# Patient Record
Sex: MALE | Race: OTHER | NOT HISPANIC OR LATINO | Employment: UNEMPLOYED | URBAN - METROPOLITAN AREA
[De-identification: names, ages, dates, MRNs, and addresses within clinical notes are randomized per-mention and may not be internally consistent; named-entity substitution may affect disease eponyms.]

---

## 2019-07-01 ENCOUNTER — OFFICE VISIT (OUTPATIENT)
Dept: UROLOGY | Facility: CLINIC | Age: 64
End: 2019-07-01
Payer: COMMERCIAL

## 2019-07-01 VITALS
DIASTOLIC BLOOD PRESSURE: 78 MMHG | HEART RATE: 74 BPM | SYSTOLIC BLOOD PRESSURE: 122 MMHG | BODY MASS INDEX: 28.79 KG/M2 | HEIGHT: 68 IN | WEIGHT: 190 LBS

## 2019-07-01 DIAGNOSIS — Z12.5 PROSTATE CANCER SCREENING: ICD-10-CM

## 2019-07-01 DIAGNOSIS — R31.0 GROSS HEMATURIA: Primary | ICD-10-CM

## 2019-07-01 LAB
SL AMB  POCT GLUCOSE, UA: ABNORMAL
SL AMB LEUKOCYTE ESTERASE,UA: ABNORMAL
SL AMB POCT BILIRUBIN,UA: ABNORMAL
SL AMB POCT BLOOD,UA: ABNORMAL
SL AMB POCT CLARITY,UA: CLEAR
SL AMB POCT COLOR,UA: YELLOW
SL AMB POCT KETONES,UA: ABNORMAL
SL AMB POCT NITRITE,UA: ABNORMAL
SL AMB POCT PH,UA: 5
SL AMB POCT SPECIFIC GRAVITY,UA: 1.03
SL AMB POCT URINE PROTEIN: ABNORMAL
SL AMB POCT UROBILINOGEN: ABNORMAL

## 2019-07-01 PROCEDURE — 81002 URINALYSIS NONAUTO W/O SCOPE: CPT | Performed by: PHYSICIAN ASSISTANT

## 2019-07-01 PROCEDURE — 99243 OFF/OP CNSLTJ NEW/EST LOW 30: CPT | Performed by: PHYSICIAN ASSISTANT

## 2019-07-01 RX ORDER — ESCITALOPRAM OXALATE 10 MG/1
10 TABLET ORAL
COMMUNITY
Start: 2018-07-11

## 2019-07-01 RX ORDER — LISINOPRIL 5 MG/1
5 TABLET ORAL
COMMUNITY
Start: 2019-06-11

## 2019-07-01 RX ORDER — SIMVASTATIN 40 MG
40 TABLET ORAL
COMMUNITY
Start: 2019-06-11

## 2019-07-01 RX ORDER — ZOLPIDEM TARTRATE 10 MG/1
TABLET ORAL
COMMUNITY
Start: 2019-05-14

## 2019-07-01 RX ORDER — GLIPIZIDE 5 MG/1
TABLET ORAL
COMMUNITY
Start: 2017-06-02

## 2019-07-01 RX ORDER — EMPAGLIFLOZIN AND LINAGLIPTIN 25; 5 MG/1; MG/1
1 TABLET, FILM COATED ORAL
COMMUNITY
Start: 2019-06-10

## 2019-07-01 RX ORDER — ASPIRIN 81 MG/1
81 TABLET, CHEWABLE ORAL DAILY
COMMUNITY
Start: 2014-10-01

## 2019-07-01 RX ORDER — GABAPENTIN 400 MG/1
400 CAPSULE ORAL 3 TIMES DAILY
COMMUNITY
Start: 2018-12-20

## 2019-07-01 RX ORDER — TAMSULOSIN HYDROCHLORIDE 0.4 MG/1
0.4 CAPSULE ORAL
COMMUNITY
Start: 2017-05-05

## 2019-07-01 RX ORDER — CETIRIZINE HYDROCHLORIDE 10 MG/1
10 TABLET ORAL DAILY
COMMUNITY
Start: 2018-06-29 | End: 2019-10-15

## 2019-07-01 RX ORDER — ACETAMINOPHEN AND CODEINE PHOSPHATE 300; 30 MG/1; MG/1
1 TABLET ORAL EVERY 6 HOURS PRN
COMMUNITY
Start: 2015-08-20 | End: 2019-10-23 | Stop reason: HOSPADM

## 2019-07-01 RX ORDER — BETAMETHASONE DIPROPIONATE 0.5 MG/G
CREAM TOPICAL
COMMUNITY
Start: 2019-03-21

## 2019-07-01 NOTE — PROGRESS NOTES
1  Gross hematuria  POCT urine dip    Basic metabolic panel    CT renal protocol    Cystoscopy   2  Prostate cancer screening  PSA, Total Screen       Assessment and plan:       1  Gross hematuria  - I reviewed with the patient a myriad of factors in which can cause gross hematuria  We discussed hematuria evaluation including a CT urogram and cystoscopy  We also discussed a BMP and PSA  Patient will continue with tamsulosin at this time  I advised hydration and avoidance of constipation  Patient is aware to contact us in the interim he has any recurrence of hematuria  Patient verbalized understanding  All questions answered  Kasey Patrick PA-C      Chief Complaint     New patient gross hematuria    History of Present Illness     Kalen Butcher is a 66 y o   male presenting today as a new patient in regards to hematuria  Patient was in the [facility] 6/27/19 2 2 gross hematuria  Patient denied any associated back or abdominal pain  Patient's urine at that time was leukocyte and nitrite negative  Creatinine was noted to be 0 89  Patient's urine culture was negative  Patient states that there was gross hematuria in approximately 2-3 voids  He had passed a small blood clot in his urine, then his you hematuria had resolved thereafter  He has not had any recurrence of hematuria since Thursday, 6/27/2019  Patient is on a daily aspirin  Patient reports that approximately 5-6 years ago he had an episode of hematuria  He reports that he underwent a cystoscopy, however was told that there were no abnormalities  He has not followed with a urologist since  He states that this was done in [place], however unsure of the physician whom performed it  Patient does take tamsulosin 0 4 mg daily  Patient states that he has been on this medication for many years    If he misses a dose, he will notice increased urinary difficulty, including urinary hesitancy and need to strain  Patient denies any previous surgical intervention on his prostate  Patient does report of previous history of nephrolithiasis  Denies any previous  instrumentation or surgical intervention for his kidney stones  Currently denies any flank pain, nausea, vomiting, fevers, or chills  Review of Systems     Review of Systems   Constitutional: Negative for activity change, appetite change, chills, diaphoresis, fatigue, fever and unexpected weight change  Respiratory: Negative for chest tightness and shortness of breath  Cardiovascular: Negative for chest pain, palpitations and leg swelling  Gastrointestinal: Negative for abdominal distention, abdominal pain, constipation, diarrhea, nausea and vomiting  Genitourinary: Positive for hematuria  Negative for decreased urine volume, difficulty urinating, dysuria, enuresis, flank pain, frequency, genital sores and urgency  Musculoskeletal: Negative for back pain, gait problem and myalgias  Skin: Negative for color change, pallor, rash and wound  Psychiatric/Behavioral: Negative for behavioral problems  The patient is not nervous/anxious  AUA SYMPTOM SCORE      Most Recent Value   AUA SYMPTOM SCORE   How often have you had a sensation of not emptying your bladder completely after you finished urinating? 0   How often have you had to urinate again less than two hours after you finished urinating? 1   How often have you found you stopped and started again several times when you urinate?  0   How often have you found it difficult to postpone urination? 0   How often have you had a weak urinary stream?  0   How often have you had to push or strain to begin urination? 0   How many times did you most typically get up to urinate from the time you went to bed at night until the time you got up in the morning?   2   Quality of Life: If you were to spend the rest of your life with your urinary condition just the way it is now, how would you feel about that?  3   AUA SYMPTOM SCORE  3                Allergies     Allergies   Allergen Reactions    Bacitracin-Polymyxin B      Redness and swelling in eye  Redness and swelling in eye      Latex Rash       Physical Exam     Physical Exam   Constitutional: He is oriented to person, place, and time  He appears well-developed and well-nourished  No distress  HENT:   Head: Normocephalic and atraumatic  Eyes: Conjunctivae are normal    Neck: Normal range of motion  No tracheal deviation present  Pulmonary/Chest: Effort normal    Musculoskeletal: Normal range of motion  He exhibits no edema or deformity  Neurological: He is alert and oriented to person, place, and time  Skin: Skin is warm and dry  No rash noted  He is not diaphoretic  No erythema  Psychiatric: He has a normal mood and affect   His behavior is normal          Vital Signs     Vitals:    07/01/19 1015   BP: 122/78   BP Location: Left arm   Patient Position: Sitting   Cuff Size: Standard   Pulse: 74   Weight: 86 2 kg (190 lb)   Height: 5' 7 5" (1 715 m)         Current Medications       Current Outpatient Medications:     acetaminophen-codeine (TYLENOL #3) 300-30 mg per tablet, Take 1 tablet by mouth, Disp: , Rfl:     aspirin 81 mg chewable tablet, Chew, Disp: , Rfl:     betamethasone, augmented, (DIPROLENE-AF) 0 05 % cream, Apply topically, Disp: , Rfl:     Blood Glucose Monitoring Suppl (D-CARE GLUCOMETER) w/Device KIT, by Does not apply route, Disp: , Rfl:     escitalopram (LEXAPRO) 10 mg tablet, Take 10 mg by mouth daily, Disp: , Rfl:     gabapentin (NEURONTIN) 400 mg capsule, TAKE 2 CAPSULES BY MOUTH 4 TIMES DAILY, Disp: , Rfl:     glipiZIDE (GLUCOTROL) 5 mg tablet, TAKE 1 TABLET BY MOUTH TWICE DAILY BEFORE MEAL(S), Disp: , Rfl:     glucose blood test strip, USE STRIP TO CHECK GLUCOSE THREE TIMES DAILY, Disp: , Rfl:     GLYXAMBI 25-5 MG TABS, , Disp: , Rfl:     lisinopril (ZESTRIL) 5 mg tablet, , Disp: , Rfl:     metFORMIN (GLUCOPHAGE) 1000 MG tablet, TAKE 1 TABLET BY MOUTH TWICE DAILY, Disp: , Rfl:     simvastatin (ZOCOR) 40 mg tablet, , Disp: , Rfl:     tamsulosin (FLOMAX) 0 4 mg, TAKE 2 CAPSULES BY MOUTH NIGHTLY, Disp: , Rfl:     zolpidem (AMBIEN) 10 mg tablet, TAKE 1 TABLET BY MOUTH AT BEDTIME AS NEEDED FOR SLEEP, Disp: , Rfl:     cetirizine (ZYRTEC ALLERGY) 10 mg tablet, Take 10 mg by mouth daily, Disp: , Rfl:     diclofenac sodium (VOLTAREN) 50 mg EC tablet, TAKE ONE TABLET BY MOUTH THREE TIMES DAILY AS NEEDED FOR PAIN, TAKE WITH FOOD, Disp: , Rfl:     magnesium oxide (MAGOX 400) 400 mg, Take 400 mg by mouth daily, Disp: , Rfl:       Active Problems     There is no problem list on file for this patient          Past Medical History     Past Medical History:   Diagnosis Date    Diabetes mellitus ([facility] [address] )     Hyperlipemia     Hypertension     Kidney stone          Surgical History     Past Surgical History:   Procedure Laterality Date    SHOULDER SURGERY           Family History     Family History   Problem Relation Age of Onset    No Known Problems Father     Diabetes Mother          Social History     Social History       Radiology

## 2019-07-05 ENCOUNTER — TELEPHONE (OUTPATIENT)
Dept: UROLOGY | Facility: CLINIC | Age: 64
End: 2019-07-05

## 2019-07-05 NOTE — TELEPHONE ENCOUNTER
Darshan Emerson is a patient of [facility] and is seen at [facility]  Patient needs to know whether he needs one or two CTs  He states there are two written in his AVS   Please call

## 2019-07-05 NOTE — TELEPHONE ENCOUNTER
Telephoned patient left a voicemail for patient to return a call to the office to further discuss the imaging order  Office number was included  in message

## 2019-07-08 NOTE — TELEPHONE ENCOUNTER
Attempted to contact patient   Left a voicemail for patient to return a call to the office  Office number included in the message

## 2019-07-10 ENCOUNTER — HOSPITAL ENCOUNTER (OUTPATIENT)
Dept: CT IMAGING | Facility: HOSPITAL | Age: 64
Discharge: HOME | End: 2019-07-10
Payer: COMMERCIAL

## 2019-07-10 DIAGNOSIS — R31.0 GROSS HEMATURIA: ICD-10-CM

## 2019-07-10 PROCEDURE — 74178 CT ABD&PLV WO CNTR FLWD CNTR: CPT

## 2019-07-10 RX ADMIN — IOHEXOL 100 ML: 350 INJECTION, SOLUTION INTRAVENOUS at 08:31

## 2019-07-12 ENCOUNTER — TELEPHONE (OUTPATIENT)
Dept: UROLOGY | Facility: CLINIC | Age: 64
End: 2019-07-12

## 2019-07-12 NOTE — TELEPHONE ENCOUNTER
Patient's CT findings reviewed with Dr Kiera Wilcox  Patient is scheduled for upcoming cystoscopy with Dr Kiera Wilcox to complete hematuria evaluation  Imaging results will be discussed at his follow up appointment

## 2019-07-29 NOTE — PROGRESS NOTES
Problem List Items Addressed This Visit        Genitourinary    Gross hematuria - Primary     Cystoscopy is negative for malignant lesions of the bladder, a prominent prostate is noted with indentation of the bladder base with large prostatic varices as well  Upper tract imaging does show a left renal mass, measuring roughly 1 centimeter, this is a unlikely cause of hematuria given the size, although a theoretical cause of hematuria         Relevant Orders    POCT Measure PVR (Completed)    Nephrolithiasis     Patient with bilateral nonobstructing kidney stone burden, no flank pain at this time, no indication for operative intervention currently            Other    Renal mass, left     I reviewed in real time with the patient his imaging showing an enhancing renal mass at the lower pole of left kidney measuring around 1 centimeter in size  We had a long discussion today regarding his options including observation, active surveillance, renal mass biopsy, as well as partial and radical nephrectomy and the merits of each of these  After a long discussion today he wishes to proceed to surgical intervention in the form of robot assisted laparoscopic left partial nephrectomy  He has provided informed consent to proceed with this operation  He will undergo medical clearance and optimization, as well as preoperative testing, and we will proceed to the [facility] operating room for robotic laparoscopic partial nephrectomy in the coming weeks to months         Relevant Orders    Case request operating room: NEPHRECTOMY PARTIAL LAPAROSCOPIC W ROBOTICS (Completed)            Discussion:    Today we had a lengthy discussion regarding implications of a diagnosis of a renal mass  We discussed that roughly 80% of enhancing renal masses are malignant and I explained that 15-20% of newly-diagnosed localized renal lesions will prove to be benign upon resection   Such lesions (largely oncocytomas and lipid-poor angiomyolipomas) are indistinguishable from renal cell carcinoma on imaging    We therefore discussed the role of renal mass biopsy  I told him that the size of his mass makes renal mass biopsy less ideal, and that there would be a risk of bleeding and nondiagnostic biopsy  Next we discussed issues of tumor size, stage, grade and histology, and the prognostic significance of each  I explained the difference between clinical and pathologic stage, and communicated that tumor grade and histology can only be definitively assessed if the mass is resected  I also communicated the nuances regarding the anatomic complexity the patient's tumor and how this might affect surgical resection  We had a discussion regarding treatment options, which I placed in the following categories: (1) active surveillance (2) ablative techniques and (3) surgical excision (open, laparoscopic, and robotic-assisted laparoscopic surgeries)  We discussed the active surveillance of small renal masses and the follow-up protocol in terms of the need for repeat imaging and patient compliance the real burden of potential patient anxiety regarding this approach  I explained that this is the preferred modality of management and small renal masses measuring less than 2 centimeters in older and sicker patients  I did explain that surgical excision is the gold-standard treatment for localized renal cell carcinoma; however, approaches such as active surveillance and ablative techniques such as cryotherapy and radiofrequency ablation can be helpful in effectively managing renal masses for patients who are at high risk for surgical intervention  Finally, we discussed options for surgical removal of the renal mass   I explained that the goals of excisional therapy are as follows:  1) Oncological safety  2) Nephron preservation  3) Application of minimally-invasive surgical approaches when appropriate    I explained that over the past 15-20 years, partial nephrectomy (i e, removal of only the renal tumor without sacrifice of the entire kidney) has become the standard of care for appropriately sized and oriented lesions  I explained that powerful long-term data now demonstrate that patients whose renal tissue is preserved have lower risks renal insufficiency and cardiovascular disease  Most importantly, patients who undergo nephron-sparing surgery have been shown to exhibit longer overall survival than those patients whose entire kidney was removed  Risks of open, laparoscopic, and robotic-assisted laparoscopic surgery were   discussed in detail including medical and anesthetic complications (e g , heart attack, stroke, deep vein thrombosis, pulmonary embolism, infection (pneumonia, etc ), bleeding with possible need for transfusion, adjacent organ involvement or injury (including but not limited to the risk of damage to large or small bowel, liver or spleen), wound complications, and reaction to medications  We discussed that overall kidney function may be impaired  We reviewed the risks for immediate or delayed, temporary or permanent dialysis in the context of the patient's current situation  The expected hospital and post-operative courses were reviewed  We reviewed what to expect with regard to incisions, post-operative pain, and risks of subsequent hernias  I explained that during open surgery a portion of a rib may be resected  I communicated that there is always a small chance of open conversion when minimally-invasive approaches are employed and that the chance of this is around 1%  For partial nephrectomy, I communicated the additional risks of urinary leak/fistula and the potential for a prolonged drain at the operative site or a ureteral stent  I also explained that conversion to a radical nephrectomy at the time or surgery or in a delayed fashion is rare but possible    Risks of delayed bleeding in the form of arteriovenous malformation and pseudoaneurysm were also discussed  Over 35 minutes of face-to-face consultation were spent today in discussing the above, patient wishes to proceed with surgery        Assessment and plan:       Please see problem oriented charting for the assessment plan of today's urological complaints    Rigo Zapien MD      Chief Complaint     Chief Complaint   Patient presents with   Donia Mandie Hematuria    Cystoscopy         History of Present Illness     Ameena Burkett is a 66 y o  gentleman with a previous history of gross hematuria status post workup in the past which was reportedly negative  He is status post a recent CT scan of the abdomen pelvis which shows some nonobstructing stone burden, a benign cause of blood in the urine, as well as a small enhancing renal mass that the left lower pole of the medial aspect of the left kidney  He does also have a history of LUTS and takes tamsulosin for this with good treatment effect  AUA symptom score is 3 with a bother score of 3  Cystoscopic evaluation today shows a large prostate with moderate obstruction of the prostatic urethra, large prostatic varices, and no bladder tumors  Reviewed with him his imaging findings, after this information he wishes to proceed to left partial nephrectomy  The following portions of the patient's history were reviewed and updated as appropriate: allergies, current medications, past family history, past medical history, past social history, past surgical history and problem list         Detailed Urologic History     - please refer to HPI    Review of Systems     Review of Systems   Constitutional: Negative  HENT: Negative  Eyes: Negative  Respiratory: Negative  Cardiovascular: Negative  Gastrointestinal: Negative  Endocrine: Negative  Genitourinary:        As per HPI   Musculoskeletal: Negative  Skin: Negative  Allergic/Immunologic: Negative  Neurological: Negative  Hematological: Negative  Psychiatric/Behavioral: Negative  Allergies     Allergies   Allergen Reactions    Bacitracin-Polymyxin B      Redness and swelling in eye  Redness and swelling in eye      Latex Rash       Physical Exam     Physical Exam   Constitutional: He is oriented to person, place, and time  He appears well-developed and well-nourished  No distress  HENT:   Head: Normocephalic and atraumatic  Right Ear: External ear normal    Left Ear: External ear normal    Nose: Nose normal    Eyes: Conjunctivae are normal  Right eye exhibits no discharge  Left eye exhibits no discharge  No scleral icterus  Neck: No tracheal deviation present  Cardiovascular: Intact distal pulses  Pulmonary/Chest: Effort normal  No stridor  No respiratory distress  Abdominal: Soft  He exhibits no distension and no mass  There is no tenderness  There is no rebound and no guarding  No hernia  No hernias, no evidence of previous abdominal surgeries   Genitourinary:   Genitourinary Comments: Penis is normal   Musculoskeletal: Normal range of motion  He exhibits no edema, tenderness or deformity  Neurological: He is alert and oriented to person, place, and time  No cranial nerve deficit  Coordination normal    Skin: Skin is warm and dry  No rash noted  He is not diaphoretic  No erythema  No pallor  Psychiatric: He has a normal mood and affect  His behavior is normal  Judgment and thought content normal    Nursing note and vitals reviewed            Vital Signs  Vitals:    08/01/19 1001   BP: 108/70   BP Location: Right arm   Patient Position: Sitting   Cuff Size: Standard   Pulse: 80   Weight: 88 7 kg (195 lb 9 6 oz)   Height: 5' 7 5" (1 715 m)         Current Medications       Current Outpatient Medications:     acetaminophen-codeine (TYLENOL #3) 300-30 mg per tablet, Take 1 tablet by mouth, Disp: , Rfl:     aspirin 81 mg chewable tablet, Chew, Disp: , Rfl:     betamethasone, augmented, (DIPROLENE-AF) 0 05 % cream, Apply topically, Disp: , Rfl:     Blood Glucose Monitoring Suppl (D-CARE GLUCOMETER) w/Device KIT, by Does not apply route, Disp: , Rfl:     cetirizine (ZYRTEC ALLERGY) 10 mg tablet, Take 10 mg by mouth daily, Disp: , Rfl:     diclofenac sodium (VOLTAREN) 50 mg EC tablet, TAKE ONE TABLET BY MOUTH THREE TIMES DAILY AS NEEDED FOR PAIN, TAKE WITH FOOD, Disp: , Rfl:     escitalopram (LEXAPRO) 10 mg tablet, Take 10 mg by mouth daily, Disp: , Rfl:     gabapentin (NEURONTIN) 400 mg capsule, TAKE 2 CAPSULES BY MOUTH 4 TIMES DAILY, Disp: , Rfl:     glipiZIDE (GLUCOTROL) 5 mg tablet, TAKE 1 TABLET BY MOUTH TWICE DAILY BEFORE MEAL(S), Disp: , Rfl:     glucose blood test strip, USE STRIP TO CHECK GLUCOSE THREE TIMES DAILY, Disp: , Rfl:     GLYXAMBI 25-5 MG TABS, , Disp: , Rfl:     lisinopril (ZESTRIL) 5 mg tablet, , Disp: , Rfl:     magnesium oxide (MAGOX 400) 400 mg, Take 400 mg by mouth daily, Disp: , Rfl:     metFORMIN (GLUCOPHAGE) 1000 MG tablet, TAKE 1 TABLET BY MOUTH TWICE DAILY, Disp: , Rfl:     simvastatin (ZOCOR) 40 mg tablet, , Disp: , Rfl:     tamsulosin (FLOMAX) 0 4 mg, TAKE 2 CAPSULES BY MOUTH NIGHTLY, Disp: , Rfl:     zolpidem (AMBIEN) 10 mg tablet, TAKE 1 TABLET BY MOUTH AT BEDTIME AS NEEDED FOR SLEEP, Disp: , Rfl:       Active Problems     Patient Active Problem List   Diagnosis    Gross hematuria    Renal mass, left    Nephrolithiasis         Past Medical History     Past Medical History:   Diagnosis Date    Diabetes mellitus ([facility] [address] )     Hyperlipemia     Hypertension     Kidney stone          Surgical History     Past Surgical History:   Procedure Laterality Date    SHOULDER SURGERY           Family History     Family History   Problem Relation Age of Onset    No Known Problems Father     Diabetes Mother          Social History     Social History     Social History     Tobacco Use   Smoking Status Former Smoker    Packs/day: 1 00    Years: 40 00    Pack years: 40 00   Smokeless Tobacco Never Used         Pertinent Lab Values     No results found for: CREATININE    No results found for: PSA          Pertinent Imaging       The patient's images were reviewed by me personally and also in real time with them in the examination room using our PACS imaging system  The imaging findings are significant for nonobstructing calculi in the right kidney and left kidney, no ureteral tumors, a small neoplasm measuring 10 millimeters at the medial aspect of the lower pole

## 2019-07-29 NOTE — PATIENT INSTRUCTIONS
Cystoscopy   WHAT YOU NEED TO KNOW:   A cystoscopy is a procedure to look inside of your urethra and bladder using a cystoscope  A cystoscope is a small tube with a light and magnifying camera on the end  The procedure is used to diagnose and treat conditions of the bladder, urethra, and prostate  The procedure is also done to remove stones or blood clots from the urethra or bladder  Your healthcare provider may do other tests, such as ureteroscopy, during a cystoscopy  DISCHARGE INSTRUCTIONS:   Call 911 if:   · You suddenly have chest pain or trouble breathing  Seek care immediately if:   · Your urine turns from pink to red, or you have clots in your urine  · You cannot urinate and your bladder feels full  · Your pain or burning becomes worse or lasts longer than 2 days  Contact your healthcare provider or urologist if:   · Your urine stays pink for longer than 3 days  · You urinate less than normal, or still feel like you have to urinate after you use the bathroom  · Your skin is itchy, swollen, or has a new rash  · You have a fever and chills  · You have questions or concerns about your condition or care  Medicines: You may  be given any of the following:  · Antibiotics  help treat or prevent a bacterial infection  · Acetaminophen  decreases pain and fever  It is available without a doctor's order  Ask how much to take and how often to take it  Follow directions  Read the labels of all other medicines you are using to see if they also contain acetaminophen, or ask your doctor or pharmacist  Acetaminophen can cause liver damage if not taken correctly  Do not use more than 4 grams (4,000 milligrams) total of acetaminophen in one day  · Take your medicine as directed  Contact your healthcare provider if you think your medicine is not helping or if you have side effects  Tell him or her if you are allergic to any medicine  Keep a list of the medicines, vitamins, and herbs you take  Include the amounts, and when and why you take them  Bring the list or the pill bottles to follow-up visits  Carry your medicine list with you in case of an emergency  Follow up with your healthcare provider as directed: You may need to have another cystoscopy  Write down your questions so you remember to ask them during your visits  Self-care:   · Drink at least 3 to 4 glasses of water daily for 2 days after your procedure  Do not drink acidic juices such as orange juice and lemonade  Drink water to help prevent blood clots from forming  It can also help decrease the amount of acid in your urine  Acid in your urine may increase the burning feeling when you urinate  · Sit in a warm tub of water  Warm water may relieve pain and bladder spasms  · Do not have sex  until your healthcare provider tells you it is okay  Sex may increase your risk for a urinary tract infection  © 2017 [employer] Information is for End User's use only and may not be sold, redistributed or otherwise used for commercial purposes  All illustrations and images included in CareNotes® are the copyrighted property of [employer]  or [employer]ots[employer]  The above information is an  only  It is not intended as medical advice for individual conditions or treatments  Talk to your doctor, nurse or pharmacist before following any medical regimen to see if it is safe and effective for you  Laparoscopic Partial Nephrectomy   WHAT YOU NEED TO KNOW:   Laparoscopic partial nephrectomy is surgery to remove part of your kidney through small incisions  The kidneys remove waste from the blood  The waste is then flushed from your body through your urine  HOW TO PREPARE:   The week before your surgery:   · Write down the correct date, time, and location of your surgery  · Arrange a ride home  Ask a family member or friend to drive you home after your surgery or procedure   Do not drive yourself home     · Ask your caregiver if you need to stop using aspirin or any other prescribed or over-the-counter medicine before your procedure or surgery  · Bring your medicine bottles or a list of your medicines when you see your caregiver  Tell your caregiver if you are allergic to any medicine  Tell your caregiver if you use any herbs, food supplements, or over-the-counter medicine  · You may need blood or urine tests before surgery  You may also need x-rays, a CT scan, or an MRI of the kidneys  Talk to your healthcare provider about these or other tests you may need  Write down the date, time, and location for each test     · You may need to avoid solid food and drink clear liquids, such as apple juice or broth, the day before surgery  You may also need to drink a liquid that cleans out your bowel  Ask your healthcare provider for more information  The night before your surgery:   · Ask caregivers about directions for eating and drinking  · You may be given medicine to help you sleep  The day of your surgery:   · Ask your caregiver before you take any medicine on the day of your surgery  Bring a list of all the medicines you take, or your pill bottles, with you to the hospital  Caregivers will check that your medicines will not interact poorly with the medicine you need for surgery  · You or a close family member will be asked to sign a legal document called a consent form  It gives caregivers permission to do the procedure or surgery  It also explains the problems that may happen, and your choices  Make sure all your questions are answered before you sign this form  · Caregivers may insert an intravenous tube (IV) into your vein  A vein in the arm is usually chosen  Through the IV tube, you may be given liquids and medicine  · An anesthesiologist will talk to you before your surgery  You may need medicine to keep you asleep or numb an area of your body during surgery   Tell caregivers if you or anyone in your family has had a problem with anesthesia in the past   WHAT WILL HAPPEN:   What will happen: Your healthcare provider will make 3 to 5 small incisions near your kidney  Your abdomen will be filled with carbon dioxide to gently lift the muscles away from your organs  This helps the surgeon see your kidney better  Your surgeon will remove the damaged part of your kidney  Your adrenal gland may also be removed  The adrenal gland sits on top of your kidney and produces hormones  Your incisions will be closed with stitches or medical glue and covered with bandages  After your surgery: You will be taken to a room to rest until you are fully awake  Healthcare providers will monitor you closely for any problems  Do not get out of bed until your healthcare provider says it is okay  When your healthcare provider sees that you are okay, you will be able to go home or taken to your hospital room  CONTACT YOUR HEALTHCARE PROVIDER IF:   · You cannot make it to your surgery  · You have a fever  · You get a cold or the flu  · You have questions or concerns about your surgery  SEEK CARE IMMEDIATELY IF:   · Your symptoms get worse  RISKS:   · You may get an infection or bleed more than expected  You may need 1 large incision instead of smaller ones  Nerves, blood vessels, muscles, or organs may be damaged  The carbon dioxide used during surgery may cause shoulder or chest pain for 1 to 2 days after your surgery  The remaining part of your kidney may not work as well as it did before  · Without treatment, your signs and symptoms will get worse  Your kidney may not be able to completely remove waste from your body  Too much waste in your body can cause dizziness, seizures, or confusion  Toxic waste buildup in your body can be life-threatening  CARE AGREEMENT:   You have the right to help plan your care  Learn about your health condition and how it may be treated   Discuss treatment options with your caregivers to decide what care you want to receive  You always have the right to refuse treatment  © 2017 [employer] Information is for End User's use only and may not be sold, redistributed or otherwise used for commercial purposes  All illustrations and images included in CareNotes® are the copyrighted property of [employer]  or Is[employer]rael [employer]  The above information is an  only  It is not intended as medical advice for individual conditions or treatments  Talk to your doctor, nurse or pharmacist before following any medical regimen to see if it is safe and effective for you

## 2019-08-01 ENCOUNTER — PROCEDURE VISIT (OUTPATIENT)
Dept: UROLOGY | Facility: CLINIC | Age: 64
End: 2019-08-01
Payer: COMMERCIAL

## 2019-08-01 VITALS
HEART RATE: 80 BPM | BODY MASS INDEX: 29.64 KG/M2 | WEIGHT: 195.6 LBS | SYSTOLIC BLOOD PRESSURE: 108 MMHG | DIASTOLIC BLOOD PRESSURE: 70 MMHG | HEIGHT: 68 IN

## 2019-08-01 DIAGNOSIS — N28.89 RENAL MASS, LEFT: ICD-10-CM

## 2019-08-01 DIAGNOSIS — N20.0 NEPHROLITHIASIS: ICD-10-CM

## 2019-08-01 DIAGNOSIS — R31.0 GROSS HEMATURIA: Primary | ICD-10-CM

## 2019-08-01 LAB — POST-VOID RESIDUAL VOLUME, ML POC: 59 ML

## 2019-08-01 PROCEDURE — 51798 US URINE CAPACITY MEASURE: CPT | Performed by: UROLOGY

## 2019-08-01 PROCEDURE — 99215 OFFICE O/P EST HI 40 MIN: CPT | Performed by: UROLOGY

## 2019-08-01 PROCEDURE — 52000 CYSTOURETHROSCOPY: CPT | Performed by: UROLOGY

## 2019-08-01 NOTE — PROGRESS NOTES
Office Cystoscopy Procedure Note    Indication:    Hematuria    Informed consent   The risks, benefits, complications, treatment options, and expected outcomes were discussed with the patient  The patient concurred with the proposed plan and provided informed consent  Anesthesia  Lidocaine jelly 2%    Antibiotic prophylaxis   None    Procedure  The patient was placed in the supineposition, was prepped and draped in the usual manner using sterile technique, and 2% lidocaine jelly instilled into the urethra  A 17 F flexible cystoscope was then inserted into the urethra and the urethra and bladder carefully examined  The following findings were noted:    Findings:  Urethra:  Normal urethra, no stricture, intact sphincter mechanism  Prostate:   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, no median lobe, the prostate projects into the lumen of the bladder by 1 centimeter  Bladder:  No lesions, tumors, defects stones or carcinoma in situ  Ureteral orifices:  Orthotopic  Other findings:  None, retroflexed view confirms    Specimens: None                 Complications:    None; patient tolerated the procedure well           Disposition: To home           Condition: Stable    Plan: Negative cystoscopic evaluation apart from moderate to high-grade obstruction the prostatic urethra with prostatic varices, no bladder tumors    Cystoscopy  Date/Time: 8/1/2019 10:29 AM  Performed by: Becky Stewart MD  Authorized by: Becky Stewart MD     Procedure details: cystoscopy    Patient tolerance: Patient tolerated the procedure well with no immediate complications    Additional Procedure Details: Negative cystoscopic evaluation apart from moderate to high-grade obstruction the prostatic urethra with prostatic varices, no bladder tumors

## 2019-08-01 NOTE — LETTER
August 1, 2019     Luma Ramirez MD  [address]  [address] [facility] [address]    Patient: Wanda Orlando   YOB: 1955   Date of Visit: 8/1/2019       Dear Dr Violetta Rinne:    Thank you for referring Delfino Banerjee to me for evaluation  Below are my notes for this consultation  If you have questions, please do not hesitate to call me  I look forward to following your patient along with you  Sincerely,        Laya Baum MD        CC: RADHA Quach MD  8/1/2019 10:29 AM  Sign at close encounter  Office Cystoscopy Procedure Note    Indication:    Hematuria    Informed consent   The risks, benefits, complications, treatment options, and expected outcomes were discussed with the patient  The patient concurred with the proposed plan and provided informed consent  Anesthesia  Lidocaine jelly 2%    Antibiotic prophylaxis   None    Procedure  The patient was placed in the supineposition, was prepped and draped in the usual manner using sterile technique, and 2% lidocaine jelly instilled into the urethra  A 17 F flexible cystoscope was then inserted into the urethra and the urethra and bladder carefully examined  The following findings were noted:    Findings:  Urethra:  Normal urethra, no stricture, intact sphincter mechanism  Prostate:   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, no median lobe, the prostate projects into the lumen of the bladder by 1 centimeter  Bladder:  No lesions, tumors, defects stones or carcinoma in situ  Ureteral orifices:  Orthotopic  Other findings:  None, retroflexed view confirms    Specimens: None                 Complications:    None; patient tolerated the procedure well           Disposition: To home           Condition: Stable    Plan: Negative cystoscopic evaluation apart from moderate to high-grade obstruction the prostatic urethra with prostatic varices, no bladder tumors    Cystoscopy  Date/Time: 8/1/2019 10:29 AM  Performed by: Denise Panchal MD  Authorized by: Denise Panchal MD     Procedure details: cystoscopy    Patient tolerance: Patient tolerated the procedure well with no immediate complications    Additional Procedure Details: Negative cystoscopic evaluation apart from moderate to high-grade obstruction the prostatic urethra with prostatic varices, no bladder tumors                  Denise Panchal MD  8/1/2019 10:28 AM  Sign at close encounter       Problem List Items Addressed This Visit        Genitourinary    Gross hematuria - Primary     Cystoscopy is negative for malignant lesions of the bladder, a prominent prostate is noted with indentation of the bladder base with large prostatic varices as well  Upper tract imaging does show a left renal mass, measuring roughly 1 centimeter, this is a unlikely cause of hematuria given the size, although a theoretical cause of hematuria         Relevant Orders    POCT Measure PVR (Completed)    Nephrolithiasis     Patient with bilateral nonobstructing kidney stone burden, no flank pain at this time, no indication for operative intervention currently            Other    Renal mass, left     I reviewed in real time with the patient his imaging showing an enhancing renal mass at the lower pole of left kidney measuring around 1 centimeter in size  We had a long discussion today regarding his options including observation, active surveillance, renal mass biopsy, as well as partial and radical nephrectomy and the merits of each of these  After a long discussion today he wishes to proceed to surgical intervention in the form of robot assisted laparoscopic left partial nephrectomy  He has provided informed consent to proceed with this operation      He will undergo medical clearance and optimization, as well as preoperative testing, and we will proceed to the [facility] operating room for robotic laparoscopic partial nephrectomy in the coming weeks to months         Relevant Orders    Case request operating room: NEPHRECTOMY PARTIAL LAPAROSCOPIC W ROBOTICS (Completed)            Discussion:    Today we had a lengthy discussion regarding implications of a diagnosis of a renal mass  We discussed that roughly 80% of enhancing renal masses are malignant and I explained that 15-20% of newly-diagnosed localized renal lesions will prove to be benign upon resection  Such lesions (largely oncocytomas and lipid-poor angiomyolipomas) are indistinguishable from renal cell carcinoma on imaging    We therefore discussed the role of renal mass biopsy  I told him that the size of his mass makes renal mass biopsy less ideal, and that there would be a risk of bleeding and nondiagnostic biopsy  Next we discussed issues of tumor size, stage, grade and histology, and the prognostic significance of each  I explained the difference between clinical and pathologic stage, and communicated that tumor grade and histology can only be definitively assessed if the mass is resected  I also communicated the nuances regarding the anatomic complexity the patient's tumor and how this might affect surgical resection  We had a discussion regarding treatment options, which I placed in the following categories: (1) active surveillance (2) ablative techniques and (3) surgical excision (open, laparoscopic, and robotic-assisted laparoscopic surgeries)  We discussed the active surveillance of small renal masses and the follow-up protocol in terms of the need for repeat imaging and patient compliance the real burden of potential patient anxiety regarding this approach  I explained that this is the preferred modality of management and small renal masses measuring less than 2 centimeters in older and sicker patients      I did explain that surgical excision is the gold-standard treatment for localized renal cell carcinoma; however, approaches such as active surveillance and ablative techniques such as cryotherapy and radiofrequency ablation can be helpful in effectively managing renal masses for patients who are at high risk for surgical intervention  Finally, we discussed options for surgical removal of the renal mass  I explained that the goals of excisional therapy are as follows:  1) Oncological safety  2) Nephron preservation  3) Application of minimally-invasive surgical approaches when appropriate    I explained that over the past 15-20 years, partial nephrectomy (i e, removal of only the renal tumor without sacrifice of the entire kidney) has become the standard of care for appropriately sized and oriented lesions  I explained that powerful long-term data now demonstrate that patients whose renal tissue is preserved have lower risks renal insufficiency and cardiovascular disease  Most importantly, patients who undergo nephron-sparing surgery have been shown to exhibit longer overall survival than those patients whose entire kidney was removed  Risks of open, laparoscopic, and robotic-assisted laparoscopic surgery were   discussed in detail including medical and anesthetic complications (e g , heart attack, stroke, deep vein thrombosis, pulmonary embolism, infection (pneumonia, etc ), bleeding with possible need for transfusion, adjacent organ involvement or injury (including but not limited to the risk of damage to large or small bowel, liver or spleen), wound complications, and reaction to medications  We discussed that overall kidney function may be impaired  We reviewed the risks for immediate or delayed, temporary or permanent dialysis in the context of the patient's current situation  The expected hospital and post-operative courses were reviewed  We reviewed what to expect with regard to incisions, post-operative pain, and risks of subsequent hernias  I explained that during open surgery a portion of a rib may be resected   I communicated that there is always a small chance of open conversion when minimally-invasive approaches are employed and that the chance of this is around 1%  For partial nephrectomy, I communicated the additional risks of urinary leak/fistula and the potential for a prolonged drain at the operative site or a ureteral stent  I also explained that conversion to a radical nephrectomy at the time or surgery or in a delayed fashion is rare but possible  Risks of delayed bleeding in the form of arteriovenous malformation and pseudoaneurysm were also discussed  Over 35 minutes of face-to-face consultation were spent today in discussing the above, patient wishes to proceed with surgery        Assessment and plan:       Please see problem oriented charting for the assessment plan of today's urological complaints    Ward Mcneal MD      Chief Complaint     Chief Complaint   Patient presents with   Thurmon Dottie Hematuria    Cystoscopy         History of Present Illness     Porsche Reed is a 66 y o  gentleman with a previous history of gross hematuria status post workup in the past which was reportedly negative  He is status post a recent CT scan of the abdomen pelvis which shows some nonobstructing stone burden, a benign cause of blood in the urine, as well as a small enhancing renal mass that the left lower pole of the medial aspect of the left kidney  He does also have a history of LUTS and takes tamsulosin for this with good treatment effect  AUA symptom score is 3 with a bother score of 3  Cystoscopic evaluation today shows a large prostate with moderate obstruction of the prostatic urethra, large prostatic varices, and no bladder tumors  Reviewed with him his imaging findings, after this information he wishes to proceed to left partial nephrectomy      The following portions of the patient's history were reviewed and updated as appropriate: allergies, current medications, past family history, past medical history, past social history, past surgical history and problem list         Detailed Urologic History     - please refer to HPI    Review of Systems     Review of Systems   Constitutional: Negative  HENT: Negative  Eyes: Negative  Respiratory: Negative  Cardiovascular: Negative  Gastrointestinal: Negative  Endocrine: Negative  Genitourinary:        As per HPI   Musculoskeletal: Negative  Skin: Negative  Allergic/Immunologic: Negative  Neurological: Negative  Hematological: Negative  Psychiatric/Behavioral: Negative  Allergies     Allergies   Allergen Reactions    Bacitracin-Polymyxin B      Redness and swelling in eye  Redness and swelling in eye      Latex Rash       Physical Exam     Physical Exam   Constitutional: He is oriented to person, place, and time  He appears well-developed and well-nourished  No distress  HENT:   Head: Normocephalic and atraumatic  Right Ear: External ear normal    Left Ear: External ear normal    Nose: Nose normal    Eyes: Conjunctivae are normal  Right eye exhibits no discharge  Left eye exhibits no discharge  No scleral icterus  Neck: No tracheal deviation present  Cardiovascular: Intact distal pulses  Pulmonary/Chest: Effort normal  No stridor  No respiratory distress  Abdominal: Soft  He exhibits no distension and no mass  There is no tenderness  There is no rebound and no guarding  No hernia  No hernias, no evidence of previous abdominal surgeries   Genitourinary:   Genitourinary Comments: Penis is normal   Musculoskeletal: Normal range of motion  He exhibits no edema, tenderness or deformity  Neurological: He is alert and oriented to person, place, and time  No cranial nerve deficit  Coordination normal    Skin: Skin is warm and dry  No rash noted  He is not diaphoretic  No erythema  No pallor  Psychiatric: He has a normal mood and affect   His behavior is normal  Judgment and thought content normal  Nursing note and vitals reviewed            Vital Signs  Vitals:    08/01/19 1001   BP: 108/70   BP Location: Right arm   Patient Position: Sitting   Cuff Size: Standard   Pulse: 80   Weight: 88 7 kg (195 lb 9 6 oz)   Height: 5' 7 5" (1 715 m)         Current Medications       Current Outpatient Medications:     acetaminophen-codeine (TYLENOL #3) 300-30 mg per tablet, Take 1 tablet by mouth, Disp: , Rfl:     aspirin 81 mg chewable tablet, Chew, Disp: , Rfl:     betamethasone, augmented, (DIPROLENE-AF) 0 05 % cream, Apply topically, Disp: , Rfl:     Blood Glucose Monitoring Suppl (D-CARE GLUCOMETER) w/Device KIT, by Does not apply route, Disp: , Rfl:     cetirizine (ZYRTEC ALLERGY) 10 mg tablet, Take 10 mg by mouth daily, Disp: , Rfl:     diclofenac sodium (VOLTAREN) 50 mg EC tablet, TAKE ONE TABLET BY MOUTH THREE TIMES DAILY AS NEEDED FOR PAIN, TAKE WITH FOOD, Disp: , Rfl:     escitalopram (LEXAPRO) 10 mg tablet, Take 10 mg by mouth daily, Disp: , Rfl:     gabapentin (NEURONTIN) 400 mg capsule, TAKE 2 CAPSULES BY MOUTH 4 TIMES DAILY, Disp: , Rfl:     glipiZIDE (GLUCOTROL) 5 mg tablet, TAKE 1 TABLET BY MOUTH TWICE DAILY BEFORE MEAL(S), Disp: , Rfl:     glucose blood test strip, USE STRIP TO CHECK GLUCOSE THREE TIMES DAILY, Disp: , Rfl:     GLYXAMBI 25-5 MG TABS, , Disp: , Rfl:     lisinopril (ZESTRIL) 5 mg tablet, , Disp: , Rfl:     magnesium oxide (MAGOX 400) 400 mg, Take 400 mg by mouth daily, Disp: , Rfl:     metFORMIN (GLUCOPHAGE) 1000 MG tablet, TAKE 1 TABLET BY MOUTH TWICE DAILY, Disp: , Rfl:     simvastatin (ZOCOR) 40 mg tablet, , Disp: , Rfl:     tamsulosin (FLOMAX) 0 4 mg, TAKE 2 CAPSULES BY MOUTH NIGHTLY, Disp: , Rfl:     zolpidem (AMBIEN) 10 mg tablet, TAKE 1 TABLET BY MOUTH AT BEDTIME AS NEEDED FOR SLEEP, Disp: , Rfl:       Active Problems     Patient Active Problem List   Diagnosis    Gross hematuria    Renal mass, left    Nephrolithiasis         Past Medical History     Past Medical History:   Diagnosis Date    Diabetes mellitus ([facility] [address] )     Hyperlipemia     Hypertension     Kidney stone          Surgical History     Past Surgical History:   Procedure Laterality Date    SHOULDER SURGERY           Family History     Family History   Problem Relation Age of Onset    No Known Problems Father     Diabetes Mother          Social History     Social History     Social History     Tobacco Use   Smoking Status Former Smoker    Packs/day: 1 00    Years: 40 00    Pack years: 40 00   Smokeless Tobacco Never Used         Pertinent Lab Values     No results found for: CREATININE    No results found for: PSA          Pertinent Imaging       The patient's images were reviewed by me personally and also in real time with them in the examination room using our PACS imaging system  The imaging findings are significant for nonobstructing calculi in the right kidney and left kidney, no ureteral tumors, a small neoplasm measuring 10 millimeters at the medial aspect of the lower pole

## 2019-08-01 NOTE — ASSESSMENT & PLAN NOTE
Cystoscopy is negative for malignant lesions of the bladder, a prominent prostate is noted with indentation of the bladder base with large prostatic varices as well      Upper tract imaging does show a left renal mass, measuring roughly 1 centimeter, this is a unlikely cause of hematuria given the size, although a theoretical cause of hematuria

## 2019-08-01 NOTE — ASSESSMENT & PLAN NOTE
Patient with bilateral nonobstructing kidney stone burden, no flank pain at this time, no indication for operative intervention currently

## 2019-08-01 NOTE — ASSESSMENT & PLAN NOTE
I reviewed in real time with the patient his imaging showing an enhancing renal mass at the lower pole of left kidney measuring around 1 centimeter in size  We had a long discussion today regarding his options including observation, active surveillance, renal mass biopsy, as well as partial and radical nephrectomy and the merits of each of these  After a long discussion today he wishes to proceed to surgical intervention in the form of robot assisted laparoscopic left partial nephrectomy  He has provided informed consent to proceed with this operation      He will undergo medical clearance and optimization, as well as preoperative testing, and we will proceed to the [facility] operating room for robotic laparoscopic partial nephrectomy in the coming weeks to months

## 2019-08-02 ENCOUNTER — TELEPHONE (OUTPATIENT)
Dept: UROLOGY | Facility: CLINIC | Age: 64
End: 2019-08-02

## 2019-10-15 ENCOUNTER — TELEPHONE (OUTPATIENT)
Dept: UROLOGY | Facility: CLINIC | Age: 64
End: 2019-10-15

## 2019-10-15 DIAGNOSIS — Z91.89 RISK FACTORS FOR OBSTRUCTIVE SLEEP APNEA: Primary | ICD-10-CM

## 2019-10-15 RX ORDER — OMEGA-3-ACID ETHYL ESTERS 1 G/1
2 CAPSULE, LIQUID FILLED ORAL 2 TIMES DAILY
COMMUNITY

## 2019-10-15 RX ORDER — RANITIDINE 150 MG/1
150 CAPSULE ORAL
COMMUNITY

## 2019-10-15 RX ORDER — PHENOL 1.4 %
10 AEROSOL, SPRAY (ML) MUCOUS MEMBRANE
COMMUNITY

## 2019-10-15 RX ORDER — DIPHENOXYLATE HYDROCHLORIDE AND ATROPINE SULFATE 2.5; .025 MG/1; MG/1
1 TABLET ORAL DAILY
COMMUNITY

## 2019-10-15 NOTE — TELEPHONE ENCOUNTER
Spoke to pt's PCP's office, asked for clearance note to be updated to state if the pt is cleared and that the pt's HA1C is 7 7  I was told a note will be sent to Dr Aileen Douglas asking for everything to be updated

## 2019-10-16 ENCOUNTER — OFFICE VISIT (OUTPATIENT)
Dept: LAB | Facility: HOSPITAL | Age: 64
End: 2019-10-16
Attending: UROLOGY
Payer: COMMERCIAL

## 2019-10-16 ENCOUNTER — TRANSCRIBE ORDERS (OUTPATIENT)
Dept: ADMINISTRATIVE | Facility: HOSPITAL | Age: 64
End: 2019-10-16

## 2019-10-16 ENCOUNTER — APPOINTMENT (OUTPATIENT)
Dept: LAB | Facility: HOSPITAL | Age: 64
End: 2019-10-16
Attending: UROLOGY
Payer: COMMERCIAL

## 2019-10-16 DIAGNOSIS — N28.89 RENAL MASS: ICD-10-CM

## 2019-10-16 DIAGNOSIS — N28.89 RENAL MASS: Primary | ICD-10-CM

## 2019-10-16 LAB
ABO GROUP BLD: NORMAL
ANION GAP SERPL CALCULATED.3IONS-SCNC: 11 MMOL/L (ref 4–13)
APTT PPP: 31 SECONDS (ref 23–37)
BASOPHILS # BLD AUTO: 0.03 THOUSANDS/ΜL (ref 0–0.1)
BASOPHILS NFR BLD AUTO: 0 % (ref 0–1)
BLD GP AB SCN SERPL QL: NEGATIVE
BUN SERPL-MCNC: 17 MG/DL (ref 5–25)
CALCIUM SERPL-MCNC: 9.2 MG/DL (ref 8.3–10.1)
CHLORIDE SERPL-SCNC: 98 MMOL/L (ref 100–108)
CO2 SERPL-SCNC: 28 MMOL/L (ref 21–32)
CREAT SERPL-MCNC: 1.01 MG/DL (ref 0.6–1.3)
EOSINOPHIL # BLD AUTO: 0.08 THOUSAND/ΜL (ref 0–0.61)
EOSINOPHIL NFR BLD AUTO: 1 % (ref 0–6)
ERYTHROCYTE [DISTWIDTH] IN BLOOD BY AUTOMATED COUNT: 13.1 % (ref 11.6–15.1)
EST. AVERAGE GLUCOSE BLD GHB EST-MCNC: 163 MG/DL
GFR SERPL CREATININE-BSD FRML MDRD: 79 ML/MIN/1.73SQ M
GLUCOSE SERPL-MCNC: 163 MG/DL (ref 65–140)
HBA1C MFR BLD: 7.3 % (ref 4.2–6.3)
HCT VFR BLD AUTO: 44.3 % (ref 36.5–49.3)
HGB BLD-MCNC: 14.5 G/DL (ref 12–17)
IMM GRANULOCYTES # BLD AUTO: 0.04 THOUSAND/UL (ref 0–0.2)
IMM GRANULOCYTES NFR BLD AUTO: 1 % (ref 0–2)
INR PPP: 0.99 (ref 0.84–1.19)
LYMPHOCYTES # BLD AUTO: 2.04 THOUSANDS/ΜL (ref 0.6–4.47)
LYMPHOCYTES NFR BLD AUTO: 29 % (ref 14–44)
MCH RBC QN AUTO: 28.7 PG (ref 26.8–34.3)
MCHC RBC AUTO-ENTMCNC: 32.7 G/DL (ref 31.4–37.4)
MCV RBC AUTO: 88 FL (ref 82–98)
MONOCYTES # BLD AUTO: 0.7 THOUSAND/ΜL (ref 0.17–1.22)
MONOCYTES NFR BLD AUTO: 10 % (ref 4–12)
NEUTROPHILS # BLD AUTO: 4.23 THOUSANDS/ΜL (ref 1.85–7.62)
NEUTS SEG NFR BLD AUTO: 59 % (ref 43–75)
NRBC BLD AUTO-RTO: 0 /100 WBCS
PLATELET # BLD AUTO: 172 THOUSANDS/UL (ref 149–390)
PMV BLD AUTO: 10.7 FL (ref 8.9–12.7)
POTASSIUM SERPL-SCNC: 4.5 MMOL/L (ref 3.5–5.3)
PROTHROMBIN TIME: 13.1 SECONDS (ref 11.6–14.5)
RBC # BLD AUTO: 5.05 MILLION/UL (ref 3.88–5.62)
RH BLD: POSITIVE
SODIUM SERPL-SCNC: 137 MMOL/L (ref 136–145)
SPECIMEN EXPIRATION DATE: NORMAL
WBC # BLD AUTO: 7.12 THOUSAND/UL (ref 4.31–10.16)

## 2019-10-16 PROCEDURE — 86901 BLOOD TYPING SEROLOGIC RH(D): CPT

## 2019-10-16 PROCEDURE — 85025 COMPLETE CBC W/AUTO DIFF WBC: CPT

## 2019-10-16 PROCEDURE — 86920 COMPATIBILITY TEST SPIN: CPT

## 2019-10-16 PROCEDURE — 80048 BASIC METABOLIC PNL TOTAL CA: CPT

## 2019-10-16 PROCEDURE — 85730 THROMBOPLASTIN TIME PARTIAL: CPT

## 2019-10-16 PROCEDURE — 36415 COLL VENOUS BLD VENIPUNCTURE: CPT

## 2019-10-16 PROCEDURE — 83036 HEMOGLOBIN GLYCOSYLATED A1C: CPT

## 2019-10-16 PROCEDURE — 86850 RBC ANTIBODY SCREEN: CPT

## 2019-10-16 PROCEDURE — 93005 ELECTROCARDIOGRAM TRACING: CPT

## 2019-10-16 PROCEDURE — 85610 PROTHROMBIN TIME: CPT

## 2019-10-16 PROCEDURE — 86900 BLOOD TYPING SEROLOGIC ABO: CPT

## 2019-10-16 NOTE — TELEPHONE ENCOUNTER
Spoke to [place] from Dr Alexi Ruiz office  The note has been updated to address the A1C and to state if the pt is cleared for surgery  [place] will fax to the note to me

## 2019-10-17 ENCOUNTER — TELEPHONE (OUTPATIENT)
Dept: UROLOGY | Facility: AMBULATORY SURGERY CENTER | Age: 64
End: 2019-10-17

## 2019-10-17 LAB
ATRIAL RATE: 60 BPM
P AXIS: 47 DEGREES
PR INTERVAL: 172 MS
QRS AXIS: -9 DEGREES
QRSD INTERVAL: 88 MS
QT INTERVAL: 384 MS
QTC INTERVAL: 384 MS
T WAVE AXIS: 24 DEGREES
VENTRICULAR RATE: 60 BPM

## 2019-10-17 PROCEDURE — 93010 ELECTROCARDIOGRAM REPORT: CPT | Performed by: INTERNAL MEDICINE

## 2019-10-17 NOTE — TELEPHONE ENCOUNTER
Adilia Calabrese called back and put his daughter Giuliano Jones on the phone  Reviewed bowel prep  Reviewed P/O restrictions including no lifting greater than 10 lbs for 6 weeks after surgery and no driving until cleared at his P/O visit with Dr Jaleesa Leos  They are aware of the date, time, and location of the P/O visit  Giulianoinocente Jones will help Adiliaindira Calabrese during his recovery  He does not have any transportation issues  They were encouraged to call with questions

## 2019-10-17 NOTE — TELEPHONE ENCOUNTER
Mark Barbosa is scheduled for robotic laparoscopic partial nephrectomy on 10/21/19 with Dr Chris Wilcox  L/JOSH [phone]  Left my direct extension and requested a call back

## 2019-10-20 ENCOUNTER — ANESTHESIA EVENT (OUTPATIENT)
Dept: PERIOP | Facility: HOSPITAL | Age: 64
End: 2019-10-20
Payer: COMMERCIAL

## 2019-10-21 ENCOUNTER — HOSPITAL ENCOUNTER (OUTPATIENT)
Facility: HOSPITAL | Age: 64
Setting detail: OUTPATIENT SURGERY
Discharge: HOME | End: 2019-10-23
Attending: UROLOGY | Admitting: UROLOGY
Payer: COMMERCIAL

## 2019-10-21 ENCOUNTER — TELEPHONE (OUTPATIENT)
Dept: UROLOGY | Facility: CLINIC | Age: 64
End: 2019-10-21

## 2019-10-21 ENCOUNTER — ANESTHESIA (OUTPATIENT)
Dept: PERIOP | Facility: HOSPITAL | Age: 64
End: 2019-10-21
Payer: COMMERCIAL

## 2019-10-21 DIAGNOSIS — N28.89 RENAL MASS, LEFT: ICD-10-CM

## 2019-10-21 LAB
ANION GAP SERPL CALCULATED.3IONS-SCNC: 10 MMOL/L (ref 4–13)
BUN SERPL-MCNC: 16 MG/DL (ref 5–25)
CALCIUM SERPL-MCNC: 8 MG/DL (ref 8.3–10.1)
CHLORIDE SERPL-SCNC: 104 MMOL/L (ref 100–108)
CO2 SERPL-SCNC: 25 MMOL/L (ref 21–32)
CREAT SERPL-MCNC: 0.91 MG/DL (ref 0.6–1.3)
ERYTHROCYTE [DISTWIDTH] IN BLOOD BY AUTOMATED COUNT: 13 % (ref 11.6–15.1)
GFR SERPL CREATININE-BSD FRML MDRD: 90 ML/MIN/1.73SQ M
GLUCOSE P FAST SERPL-MCNC: 178 MG/DL (ref 65–99)
GLUCOSE SERPL-MCNC: 136 MG/DL (ref 65–140)
GLUCOSE SERPL-MCNC: 148 MG/DL (ref 65–140)
GLUCOSE SERPL-MCNC: 151 MG/DL (ref 65–140)
GLUCOSE SERPL-MCNC: 175 MG/DL (ref 65–140)
GLUCOSE SERPL-MCNC: 178 MG/DL (ref 65–140)
GLUCOSE SERPL-MCNC: 208 MG/DL (ref 65–140)
HCT VFR BLD AUTO: 40.2 % (ref 36.5–49.3)
HGB BLD-MCNC: 13.3 G/DL (ref 12–17)
MCH RBC QN AUTO: 29.4 PG (ref 26.8–34.3)
MCHC RBC AUTO-ENTMCNC: 33.1 G/DL (ref 31.4–37.4)
MCV RBC AUTO: 89 FL (ref 82–98)
PLATELET # BLD AUTO: 149 THOUSANDS/UL (ref 149–390)
PMV BLD AUTO: 9.7 FL (ref 8.9–12.7)
POTASSIUM SERPL-SCNC: 3.8 MMOL/L (ref 3.5–5.3)
RBC # BLD AUTO: 4.52 MILLION/UL (ref 3.88–5.62)
SODIUM SERPL-SCNC: 139 MMOL/L (ref 136–145)
WBC # BLD AUTO: 6.49 THOUSAND/UL (ref 4.31–10.16)

## 2019-10-21 PROCEDURE — 88342 IMHCHEM/IMCYTCHM 1ST ANTB: CPT | Performed by: PATHOLOGY

## 2019-10-21 PROCEDURE — 88307 TISSUE EXAM BY PATHOLOGIST: CPT | Performed by: PATHOLOGY

## 2019-10-21 PROCEDURE — 50543 LAPARO PARTIAL NEPHRECTOMY: CPT | Performed by: UROLOGY

## 2019-10-21 PROCEDURE — 88344 IMHCHEM/IMCYTCHM EA MLT ANTB: CPT | Performed by: PATHOLOGY

## 2019-10-21 PROCEDURE — NC001 PR NO CHARGE: Performed by: UROLOGY

## 2019-10-21 PROCEDURE — 88341 IMHCHEM/IMCYTCHM EA ADD ANTB: CPT | Performed by: PATHOLOGY

## 2019-10-21 PROCEDURE — 82948 REAGENT STRIP/BLOOD GLUCOSE: CPT

## 2019-10-21 PROCEDURE — NC001 PR NO CHARGE: Performed by: PHYSICIAN ASSISTANT

## 2019-10-21 PROCEDURE — 85027 COMPLETE CBC AUTOMATED: CPT | Performed by: UROLOGY

## 2019-10-21 PROCEDURE — 80048 BASIC METABOLIC PNL TOTAL CA: CPT | Performed by: UROLOGY

## 2019-10-21 RX ORDER — DOCUSATE SODIUM 100 MG/1
100 CAPSULE, LIQUID FILLED ORAL 3 TIMES DAILY
Qty: 42 CAPSULE | Refills: 0 | Status: SHIPPED | OUTPATIENT
Start: 2019-10-21 | End: 2019-11-05

## 2019-10-21 RX ORDER — OXYCODONE HYDROCHLORIDE 10 MG/1
10 TABLET ORAL EVERY 4 HOURS PRN
Status: DISCONTINUED | OUTPATIENT
Start: 2019-10-21 | End: 2019-10-23 | Stop reason: HOSPADM

## 2019-10-21 RX ORDER — PRAVASTATIN SODIUM 80 MG/1
80 TABLET ORAL
Status: DISCONTINUED | OUTPATIENT
Start: 2019-10-21 | End: 2019-10-23 | Stop reason: HOSPADM

## 2019-10-21 RX ORDER — HYDROMORPHONE HCL/PF 1 MG/ML
0.5 SYRINGE (ML) INJECTION
Status: DISCONTINUED | OUTPATIENT
Start: 2019-10-21 | End: 2019-10-23 | Stop reason: HOSPADM

## 2019-10-21 RX ORDER — ONDANSETRON 2 MG/ML
4 INJECTION INTRAMUSCULAR; INTRAVENOUS ONCE AS NEEDED
Status: DISCONTINUED | OUTPATIENT
Start: 2019-10-21 | End: 2019-10-21 | Stop reason: HOSPADM

## 2019-10-21 RX ORDER — OXYCODONE HYDROCHLORIDE 5 MG/1
5 TABLET ORAL EVERY 4 HOURS PRN
Status: DISCONTINUED | OUTPATIENT
Start: 2019-10-21 | End: 2019-10-23 | Stop reason: HOSPADM

## 2019-10-21 RX ORDER — SODIUM CHLORIDE, SODIUM LACTATE, POTASSIUM CHLORIDE, CALCIUM CHLORIDE 600; 310; 30; 20 MG/100ML; MG/100ML; MG/100ML; MG/100ML
125 INJECTION, SOLUTION INTRAVENOUS CONTINUOUS
Status: DISCONTINUED | OUTPATIENT
Start: 2019-10-21 | End: 2019-10-23

## 2019-10-21 RX ORDER — ROCURONIUM BROMIDE 10 MG/ML
INJECTION, SOLUTION INTRAVENOUS AS NEEDED
Status: DISCONTINUED | OUTPATIENT
Start: 2019-10-21 | End: 2019-10-21 | Stop reason: SURG

## 2019-10-21 RX ORDER — MAGNESIUM HYDROXIDE 1200 MG/15ML
LIQUID ORAL AS NEEDED
Status: DISCONTINUED | OUTPATIENT
Start: 2019-10-21 | End: 2019-10-21 | Stop reason: HOSPADM

## 2019-10-21 RX ORDER — ONDANSETRON 2 MG/ML
INJECTION INTRAMUSCULAR; INTRAVENOUS AS NEEDED
Status: DISCONTINUED | OUTPATIENT
Start: 2019-10-21 | End: 2019-10-21 | Stop reason: SURG

## 2019-10-21 RX ORDER — DOCUSATE SODIUM 100 MG/1
100 CAPSULE, LIQUID FILLED ORAL 2 TIMES DAILY
Status: DISCONTINUED | OUTPATIENT
Start: 2019-10-21 | End: 2019-10-23 | Stop reason: HOSPADM

## 2019-10-21 RX ORDER — TAMSULOSIN HYDROCHLORIDE 0.4 MG/1
0.4 CAPSULE ORAL
Status: DISCONTINUED | OUTPATIENT
Start: 2019-10-21 | End: 2019-10-23 | Stop reason: HOSPADM

## 2019-10-21 RX ORDER — CYCLOBENZAPRINE HCL 10 MG
5 TABLET ORAL 3 TIMES DAILY
Status: DISCONTINUED | OUTPATIENT
Start: 2019-10-21 | End: 2019-10-23 | Stop reason: HOSPADM

## 2019-10-21 RX ORDER — CEFAZOLIN SODIUM 2 G/50ML
2000 SOLUTION INTRAVENOUS ONCE
Status: COMPLETED | OUTPATIENT
Start: 2019-10-21 | End: 2019-10-21

## 2019-10-21 RX ORDER — SODIUM CHLORIDE, SODIUM LACTATE, POTASSIUM CHLORIDE, CALCIUM CHLORIDE 600; 310; 30; 20 MG/100ML; MG/100ML; MG/100ML; MG/100ML
INJECTION, SOLUTION INTRAVENOUS CONTINUOUS PRN
Status: DISCONTINUED | OUTPATIENT
Start: 2019-10-21 | End: 2019-10-21 | Stop reason: SURG

## 2019-10-21 RX ORDER — METOCLOPRAMIDE HYDROCHLORIDE 5 MG/ML
10 INJECTION INTRAMUSCULAR; INTRAVENOUS ONCE AS NEEDED
Status: DISCONTINUED | OUTPATIENT
Start: 2019-10-21 | End: 2019-10-21 | Stop reason: HOSPADM

## 2019-10-21 RX ORDER — ONDANSETRON 2 MG/ML
4 INJECTION INTRAMUSCULAR; INTRAVENOUS EVERY 6 HOURS PRN
Status: DISCONTINUED | OUTPATIENT
Start: 2019-10-21 | End: 2019-10-23 | Stop reason: HOSPADM

## 2019-10-21 RX ORDER — CEFAZOLIN SODIUM 2 G/50ML
2000 SOLUTION INTRAVENOUS EVERY 8 HOURS
Status: COMPLETED | OUTPATIENT
Start: 2019-10-21 | End: 2019-10-22

## 2019-10-21 RX ORDER — HYDROMORPHONE HCL/PF 1 MG/ML
0.2 SYRINGE (ML) INJECTION
Status: DISCONTINUED | OUTPATIENT
Start: 2019-10-21 | End: 2019-10-21 | Stop reason: HOSPADM

## 2019-10-21 RX ORDER — BUPIVACAINE HYDROCHLORIDE 2.5 MG/ML
INJECTION, SOLUTION INFILTRATION; PERINEURAL AS NEEDED
Status: DISCONTINUED | OUTPATIENT
Start: 2019-10-21 | End: 2019-10-21 | Stop reason: HOSPADM

## 2019-10-21 RX ORDER — MIDAZOLAM HYDROCHLORIDE 1 MG/ML
INJECTION INTRAMUSCULAR; INTRAVENOUS AS NEEDED
Status: DISCONTINUED | OUTPATIENT
Start: 2019-10-21 | End: 2019-10-21 | Stop reason: SURG

## 2019-10-21 RX ORDER — DIPHENHYDRAMINE HCL 25 MG
25 TABLET ORAL EVERY 6 HOURS PRN
Status: DISCONTINUED | OUTPATIENT
Start: 2019-10-21 | End: 2019-10-23 | Stop reason: HOSPADM

## 2019-10-21 RX ORDER — NEOSTIGMINE METHYLSULFATE 1 MG/ML
INJECTION INTRAVENOUS AS NEEDED
Status: DISCONTINUED | OUTPATIENT
Start: 2019-10-21 | End: 2019-10-21 | Stop reason: SURG

## 2019-10-21 RX ORDER — KETOROLAC TROMETHAMINE 30 MG/ML
INJECTION, SOLUTION INTRAMUSCULAR; INTRAVENOUS AS NEEDED
Status: DISCONTINUED | OUTPATIENT
Start: 2019-10-21 | End: 2019-10-21 | Stop reason: SURG

## 2019-10-21 RX ORDER — SODIUM CHLORIDE 9 MG/ML
INJECTION, SOLUTION INTRAVENOUS CONTINUOUS PRN
Status: DISCONTINUED | OUTPATIENT
Start: 2019-10-21 | End: 2019-10-21 | Stop reason: SURG

## 2019-10-21 RX ORDER — SODIUM CHLORIDE 9 MG/ML
INJECTION, SOLUTION INTRAVENOUS CONTINUOUS PRN
Status: DISCONTINUED | OUTPATIENT
Start: 2019-10-21 | End: 2019-10-21

## 2019-10-21 RX ORDER — GLYCOPYRROLATE 0.2 MG/ML
INJECTION INTRAMUSCULAR; INTRAVENOUS AS NEEDED
Status: DISCONTINUED | OUTPATIENT
Start: 2019-10-21 | End: 2019-10-21 | Stop reason: SURG

## 2019-10-21 RX ORDER — GABAPENTIN 400 MG/1
400 CAPSULE ORAL 3 TIMES DAILY
Status: DISCONTINUED | OUTPATIENT
Start: 2019-10-21 | End: 2019-10-23 | Stop reason: HOSPADM

## 2019-10-21 RX ORDER — PROPOFOL 10 MG/ML
INJECTION, EMULSION INTRAVENOUS AS NEEDED
Status: DISCONTINUED | OUTPATIENT
Start: 2019-10-21 | End: 2019-10-21 | Stop reason: SURG

## 2019-10-21 RX ORDER — ZOLPIDEM TARTRATE 5 MG/1
10 TABLET ORAL
Status: DISCONTINUED | OUTPATIENT
Start: 2019-10-21 | End: 2019-10-23 | Stop reason: HOSPADM

## 2019-10-21 RX ORDER — FENTANYL CITRATE/PF 50 MCG/ML
50 SYRINGE (ML) INJECTION
Status: COMPLETED | OUTPATIENT
Start: 2019-10-21 | End: 2019-10-21

## 2019-10-21 RX ORDER — POLYETHYLENE GLYCOL 3350 17 G/17G
17 POWDER, FOR SOLUTION ORAL DAILY
Qty: 14 EACH | Refills: 0 | Status: SHIPPED | OUTPATIENT
Start: 2019-10-21

## 2019-10-21 RX ORDER — ESCITALOPRAM OXALATE 10 MG/1
10 TABLET ORAL
Status: DISCONTINUED | OUTPATIENT
Start: 2019-10-22 | End: 2019-10-23 | Stop reason: HOSPADM

## 2019-10-21 RX ORDER — FAMOTIDINE 20 MG/1
20 TABLET, FILM COATED ORAL 2 TIMES DAILY
Status: DISCONTINUED | OUTPATIENT
Start: 2019-10-21 | End: 2019-10-23 | Stop reason: HOSPADM

## 2019-10-21 RX ORDER — OXYCODONE HYDROCHLORIDE AND ACETAMINOPHEN 5; 325 MG/1; MG/1
1 TABLET ORAL EVERY 6 HOURS PRN
Qty: 10 TABLET | Refills: 0 | Status: SHIPPED | OUTPATIENT
Start: 2019-10-21 | End: 2019-10-26

## 2019-10-21 RX ORDER — ACETAMINOPHEN 325 MG/1
650 TABLET ORAL EVERY 4 HOURS PRN
Status: DISCONTINUED | OUTPATIENT
Start: 2019-10-21 | End: 2019-10-23 | Stop reason: HOSPADM

## 2019-10-21 RX ORDER — FENTANYL CITRATE 50 UG/ML
INJECTION, SOLUTION INTRAMUSCULAR; INTRAVENOUS AS NEEDED
Status: DISCONTINUED | OUTPATIENT
Start: 2019-10-21 | End: 2019-10-21 | Stop reason: SURG

## 2019-10-21 RX ORDER — POLYETHYLENE GLYCOL 3350 17 G/17G
17 POWDER, FOR SOLUTION ORAL DAILY
Status: DISCONTINUED | OUTPATIENT
Start: 2019-10-21 | End: 2019-10-23 | Stop reason: HOSPADM

## 2019-10-21 RX ORDER — MAGNESIUM HYDROXIDE/ALUMINUM HYDROXICE/SIMETHICONE 120; 1200; 1200 MG/30ML; MG/30ML; MG/30ML
30 SUSPENSION ORAL EVERY 6 HOURS PRN
Status: DISCONTINUED | OUTPATIENT
Start: 2019-10-21 | End: 2019-10-23 | Stop reason: HOSPADM

## 2019-10-21 RX ADMIN — DOCUSATE SODIUM 100 MG: 100 CAPSULE, LIQUID FILLED ORAL at 13:41

## 2019-10-21 RX ADMIN — CEFAZOLIN SODIUM 2000 MG: 2 SOLUTION INTRAVENOUS at 16:05

## 2019-10-21 RX ADMIN — GLYCOPYRROLATE 0.6 MG: 0.2 INJECTION, SOLUTION INTRAMUSCULAR; INTRAVENOUS at 10:15

## 2019-10-21 RX ADMIN — FENTANYL CITRATE 50 MCG: 50 INJECTION INTRAMUSCULAR; INTRAVENOUS at 09:07

## 2019-10-21 RX ADMIN — PROPOFOL 50 MG: 10 INJECTION, EMULSION INTRAVENOUS at 08:03

## 2019-10-21 RX ADMIN — FENTANYL CITRATE 50 MCG: 50 INJECTION INTRAMUSCULAR; INTRAVENOUS at 08:38

## 2019-10-21 RX ADMIN — INSULIN LISPRO 2 UNITS: 100 INJECTION, SOLUTION INTRAVENOUS; SUBCUTANEOUS at 16:37

## 2019-10-21 RX ADMIN — CYCLOBENZAPRINE HYDROCHLORIDE 5 MG: 10 TABLET, FILM COATED ORAL at 15:55

## 2019-10-21 RX ADMIN — SODIUM CHLORIDE, SODIUM LACTATE, POTASSIUM CHLORIDE, AND CALCIUM CHLORIDE: .6; .31; .03; .02 INJECTION, SOLUTION INTRAVENOUS at 07:54

## 2019-10-21 RX ADMIN — ROCURONIUM BROMIDE 30 MG: 10 INJECTION, SOLUTION INTRAVENOUS at 08:03

## 2019-10-21 RX ADMIN — GABAPENTIN 400 MG: 400 CAPSULE ORAL at 15:55

## 2019-10-21 RX ADMIN — ROCURONIUM BROMIDE 30 MG: 10 INJECTION, SOLUTION INTRAVENOUS at 09:04

## 2019-10-21 RX ADMIN — ROCURONIUM BROMIDE 20 MG: 10 INJECTION, SOLUTION INTRAVENOUS at 08:30

## 2019-10-21 RX ADMIN — SODIUM CHLORIDE, SODIUM LACTATE, POTASSIUM CHLORIDE, AND CALCIUM CHLORIDE 125 ML/HR: .6; .31; .03; .02 INJECTION, SOLUTION INTRAVENOUS at 18:24

## 2019-10-21 RX ADMIN — CEFAZOLIN SODIUM 2000 MG: 2 SOLUTION INTRAVENOUS at 08:06

## 2019-10-21 RX ADMIN — POLYETHYLENE GLYCOL 3350 17 G: 17 POWDER, FOR SOLUTION ORAL at 13:41

## 2019-10-21 RX ADMIN — CYCLOBENZAPRINE HYDROCHLORIDE 5 MG: 10 TABLET, FILM COATED ORAL at 21:42

## 2019-10-21 RX ADMIN — FENTANYL CITRATE 50 MCG: 50 INJECTION INTRAMUSCULAR; INTRAVENOUS at 10:56

## 2019-10-21 RX ADMIN — OXYCODONE HYDROCHLORIDE 5 MG: 5 TABLET ORAL at 21:42

## 2019-10-21 RX ADMIN — KETOROLAC TROMETHAMINE 15 MG: 30 INJECTION, SOLUTION INTRAMUSCULAR at 10:15

## 2019-10-21 RX ADMIN — OXYCODONE HYDROCHLORIDE 10 MG: 10 TABLET ORAL at 16:59

## 2019-10-21 RX ADMIN — NEOSTIGMINE METHYLSULFATE 3 MG: 1 INJECTION INTRAVENOUS at 10:15

## 2019-10-21 RX ADMIN — LIDOCAINE HYDROCHLORIDE 80 MG: 20 INJECTION, SOLUTION INTRAVENOUS at 08:02

## 2019-10-21 RX ADMIN — PROPOFOL 150 MG: 10 INJECTION, EMULSION INTRAVENOUS at 08:02

## 2019-10-21 RX ADMIN — SODIUM CHLORIDE: 0.9 INJECTION, SOLUTION INTRAVENOUS at 08:15

## 2019-10-21 RX ADMIN — HYDROMORPHONE HYDROCHLORIDE 0.5 MG: 1 INJECTION, SOLUTION INTRAMUSCULAR; INTRAVENOUS; SUBCUTANEOUS at 13:44

## 2019-10-21 RX ADMIN — DOCUSATE SODIUM 100 MG: 100 CAPSULE, LIQUID FILLED ORAL at 18:24

## 2019-10-21 RX ADMIN — HYDROMORPHONE HYDROCHLORIDE 0.2 MG: 1 INJECTION, SOLUTION INTRAMUSCULAR; INTRAVENOUS; SUBCUTANEOUS at 11:20

## 2019-10-21 RX ADMIN — ONDANSETRON 4 MG: 2 INJECTION INTRAMUSCULAR; INTRAVENOUS at 10:05

## 2019-10-21 RX ADMIN — ZOLPIDEM TARTRATE 10 MG: 5 TABLET, COATED ORAL at 22:41

## 2019-10-21 RX ADMIN — FENTANYL CITRATE 50 MCG: 50 INJECTION INTRAMUSCULAR; INTRAVENOUS at 11:04

## 2019-10-21 RX ADMIN — TAMSULOSIN HYDROCHLORIDE 0.4 MG: 0.4 CAPSULE ORAL at 15:55

## 2019-10-21 RX ADMIN — MIDAZOLAM HYDROCHLORIDE 2 MG: 1 INJECTION, SOLUTION INTRAMUSCULAR; INTRAVENOUS at 07:54

## 2019-10-21 RX ADMIN — FENTANYL CITRATE 50 MCG: 50 INJECTION INTRAMUSCULAR; INTRAVENOUS at 10:09

## 2019-10-21 RX ADMIN — HYDROMORPHONE HYDROCHLORIDE 0.2 MG: 1 INJECTION, SOLUTION INTRAMUSCULAR; INTRAVENOUS; SUBCUTANEOUS at 12:10

## 2019-10-21 RX ADMIN — GABAPENTIN 400 MG: 400 CAPSULE ORAL at 21:42

## 2019-10-21 RX ADMIN — PRAVASTATIN SODIUM 80 MG: 80 TABLET ORAL at 15:56

## 2019-10-21 RX ADMIN — FAMOTIDINE 20 MG: 20 TABLET ORAL at 18:24

## 2019-10-21 RX ADMIN — FAMOTIDINE 20 MG: 20 TABLET ORAL at 13:40

## 2019-10-21 RX ADMIN — FENTANYL CITRATE 50 MCG: 50 INJECTION INTRAMUSCULAR; INTRAVENOUS at 08:42

## 2019-10-21 NOTE — ANESTHESIA POSTPROCEDURE EVALUATION
Post-Op Assessment Note    CV Status:  Stable  Pain Score: 0    Pain management: adequate     Mental Status:  Alert and awake   Hydration Status:  Euvolemic   PONV Controlled:  Controlled   Airway Patency:  Patent   Post Op Vitals Reviewed: Yes      Staff: CRNA           BP  149/79    Temp  97 9   Pulse  83   Resp  14   SpO2  100

## 2019-10-21 NOTE — ANESTHESIA PREPROCEDURE EVALUATION
Review of Systems/Medical History  Patient summary reviewed  Chart reviewed  No history of anesthetic complications     Cardiovascular  Exercise tolerance (METS): >4,  No pacemaker/AICD, Hyperlipidemia, Hypertension , No CAD , No cardiac stents     Pulmonary  Not a smoker (FORMER) , No asthma , No recent URI ,        GI/Hepatic    GERD well controlled,             Endo/Other  Diabetes well controlled type 2 ,      GYN       Hematology   Musculoskeletal       Neurology    No TIA, No CVA ,    Psychology         Lab Results   Component Value Date    WBC 7 12 10/16/2019    HGB 14 5 10/16/2019     10/16/2019     Lab Results   Component Value Date    SODIUM 137 10/16/2019    K 4 5 10/16/2019    BUN 17 10/16/2019    CREATININE 1 01 10/16/2019    EGFR 79 10/16/2019     Lab Results   Component Value Date    PTT 31 10/16/2019      Lab Results   Component Value Date    INR 0 99 10/16/2019       Blood type B    Lab Results   Component Value Date    HGBA1C 7 3 (H) 10/16/2019       Physical Exam    Airway    Mallampati score: II  TM Distance: >3 FB       Dental       Cardiovascular      Pulmonary      Other Findings        Anesthesia Plan  ASA Score- 2     Anesthesia Type- general and regional with ASA Monitors  Additional Monitors: arterial line  Airway Plan: ETT  Comment:  JOSH Schneider , have personally seen and evaluated the patient prior to anesthetic care  I have reviewed the pre-anesthetic record, and other medical records if appropriate to the anesthetic care  If a CRNA is involved in the case, I have reviewed the CRNA assessment, if present, and agree  Risks/benefits and alternatives discussed with patient including possible PONV, sore throat, and possibility of rare anesthetic and surgical emergencies        Plan Factors-    Induction- intravenous  Postoperative Plan-     Informed Consent- Anesthetic plan and risks discussed with patient    I personally reviewed this patient with the CRNA  Discussed and agreed on the Anesthesia Plan with the CRNA  Anam Nuno

## 2019-10-21 NOTE — TELEPHONE ENCOUNTER
Patient is status post partial nephrectomy, left, will need a visit with me in 2 weeks for pathology review, postop visit

## 2019-10-21 NOTE — ANESTHESIA PROCEDURE NOTES
Arterial Line Insertion  Performed by: Debora Schreiber CRNA  Authorized by: Debora Schreiber CRNA   Consent: Verbal consent obtained  Written consent obtained  Risks and benefits: risks, benefits and alternatives were discussed  Consent given by: patient  Patient understanding: patient states understanding of the procedure being performed  Patient consent: the patient's understanding of the procedure matches consent given  Procedure consent: procedure consent matches procedure scheduled  Relevant documents: relevant documents present and verified  Patient identity confirmed: arm band  Preparation: Patient was prepped and draped in the usual sterile fashion    Indications: hemodynamic monitoring  Orientation:  Left  Location: radial artery  Procedure Details:  Needle gauge: 20  Seldinger technique: Seldinger technique used  Number of attempts: 2    Post-procedure:  Post-procedure: dressing applied  Waveform: good waveform and waveform confirmed  Post-procedure CNS: unchanged and normal  Patient tolerance: Patient tolerated the procedure well with no immediate complications

## 2019-10-21 NOTE — TELEPHONE ENCOUNTER
Patient currently scheduled for follow up on 11/5/19 with Dr Ulis Bence at the [facility] office  Patient remains in patient as of this afternoon  Will need post op call done

## 2019-10-22 LAB
ANION GAP SERPL CALCULATED.3IONS-SCNC: 7 MMOL/L (ref 4–13)
BUN SERPL-MCNC: 14 MG/DL (ref 5–25)
CALCIUM SERPL-MCNC: 8 MG/DL (ref 8.3–10.1)
CHLORIDE SERPL-SCNC: 105 MMOL/L (ref 100–108)
CO2 SERPL-SCNC: 27 MMOL/L (ref 21–32)
CREAT FLD-MCNC: 1 MG/DL
CREAT SERPL-MCNC: 1.12 MG/DL (ref 0.6–1.3)
ERYTHROCYTE [DISTWIDTH] IN BLOOD BY AUTOMATED COUNT: 13.3 % (ref 11.6–15.1)
GFR SERPL CREATININE-BSD FRML MDRD: 70 ML/MIN/1.73SQ M
GLUCOSE SERPL-MCNC: 184 MG/DL (ref 65–140)
GLUCOSE SERPL-MCNC: 208 MG/DL (ref 65–140)
GLUCOSE SERPL-MCNC: 217 MG/DL (ref 65–140)
GLUCOSE SERPL-MCNC: 231 MG/DL (ref 65–140)
GLUCOSE SERPL-MCNC: 239 MG/DL (ref 65–140)
HCT VFR BLD AUTO: 38.7 % (ref 36.5–49.3)
HGB BLD-MCNC: 12.1 G/DL (ref 12–17)
MCH RBC QN AUTO: 28.6 PG (ref 26.8–34.3)
MCHC RBC AUTO-ENTMCNC: 31.3 G/DL (ref 31.4–37.4)
MCV RBC AUTO: 92 FL (ref 82–98)
PLATELET # BLD AUTO: 151 THOUSANDS/UL (ref 149–390)
PMV BLD AUTO: 10.1 FL (ref 8.9–12.7)
POTASSIUM SERPL-SCNC: 4 MMOL/L (ref 3.5–5.3)
RBC # BLD AUTO: 4.23 MILLION/UL (ref 3.88–5.62)
SODIUM SERPL-SCNC: 139 MMOL/L (ref 136–145)
WBC # BLD AUTO: 6.19 THOUSAND/UL (ref 4.31–10.16)

## 2019-10-22 PROCEDURE — 80048 BASIC METABOLIC PNL TOTAL CA: CPT | Performed by: UROLOGY

## 2019-10-22 PROCEDURE — 82948 REAGENT STRIP/BLOOD GLUCOSE: CPT

## 2019-10-22 PROCEDURE — 99024 POSTOP FOLLOW-UP VISIT: CPT | Performed by: NURSE PRACTITIONER

## 2019-10-22 PROCEDURE — 82570 ASSAY OF URINE CREATININE: CPT | Performed by: UROLOGY

## 2019-10-22 PROCEDURE — 85027 COMPLETE CBC AUTOMATED: CPT | Performed by: UROLOGY

## 2019-10-22 RX ORDER — METOCLOPRAMIDE 10 MG/1
10 TABLET ORAL
Status: DISCONTINUED | OUTPATIENT
Start: 2019-10-22 | End: 2019-10-23 | Stop reason: HOSPADM

## 2019-10-22 RX ORDER — BISACODYL 10 MG
10 SUPPOSITORY, RECTAL RECTAL DAILY PRN
Status: DISCONTINUED | OUTPATIENT
Start: 2019-10-22 | End: 2019-10-23 | Stop reason: HOSPADM

## 2019-10-22 RX ADMIN — OXYCODONE HYDROCHLORIDE 10 MG: 10 TABLET ORAL at 08:31

## 2019-10-22 RX ADMIN — CYCLOBENZAPRINE HYDROCHLORIDE 5 MG: 10 TABLET, FILM COATED ORAL at 08:30

## 2019-10-22 RX ADMIN — ZOLPIDEM TARTRATE 10 MG: 5 TABLET, COATED ORAL at 21:42

## 2019-10-22 RX ADMIN — ALUMINUM HYDROXIDE, MAGNESIUM HYDROXIDE, AND SIMETHICONE 30 ML: 200; 200; 20 SUSPENSION ORAL at 17:01

## 2019-10-22 RX ADMIN — OXYCODONE HYDROCHLORIDE 10 MG: 10 TABLET ORAL at 18:36

## 2019-10-22 RX ADMIN — INSULIN LISPRO 3 UNITS: 100 INJECTION, SOLUTION INTRAVENOUS; SUBCUTANEOUS at 16:59

## 2019-10-22 RX ADMIN — METOCLOPRAMIDE HYDROCHLORIDE 10 MG: 10 TABLET ORAL at 17:03

## 2019-10-22 RX ADMIN — POLYETHYLENE GLYCOL 3350 17 G: 17 POWDER, FOR SOLUTION ORAL at 08:30

## 2019-10-22 RX ADMIN — HYDROMORPHONE HYDROCHLORIDE 0.5 MG: 1 INJECTION, SOLUTION INTRAMUSCULAR; INTRAVENOUS; SUBCUTANEOUS at 16:55

## 2019-10-22 RX ADMIN — GABAPENTIN 400 MG: 400 CAPSULE ORAL at 21:42

## 2019-10-22 RX ADMIN — OXYCODONE HYDROCHLORIDE 10 MG: 10 TABLET ORAL at 02:42

## 2019-10-22 RX ADMIN — FAMOTIDINE 20 MG: 20 TABLET ORAL at 17:04

## 2019-10-22 RX ADMIN — CEFAZOLIN SODIUM 2000 MG: 2 SOLUTION INTRAVENOUS at 00:36

## 2019-10-22 RX ADMIN — SODIUM CHLORIDE, SODIUM LACTATE, POTASSIUM CHLORIDE, AND CALCIUM CHLORIDE 125 ML/HR: .6; .31; .03; .02 INJECTION, SOLUTION INTRAVENOUS at 10:33

## 2019-10-22 RX ADMIN — GABAPENTIN 400 MG: 400 CAPSULE ORAL at 08:30

## 2019-10-22 RX ADMIN — SODIUM CHLORIDE, SODIUM LACTATE, POTASSIUM CHLORIDE, AND CALCIUM CHLORIDE 125 ML/HR: .6; .31; .03; .02 INJECTION, SOLUTION INTRAVENOUS at 02:01

## 2019-10-22 RX ADMIN — GABAPENTIN 400 MG: 400 CAPSULE ORAL at 17:03

## 2019-10-22 RX ADMIN — CEFAZOLIN SODIUM 2000 MG: 2 SOLUTION INTRAVENOUS at 08:30

## 2019-10-22 RX ADMIN — FAMOTIDINE 20 MG: 20 TABLET ORAL at 08:30

## 2019-10-22 RX ADMIN — DOCUSATE SODIUM 100 MG: 100 CAPSULE, LIQUID FILLED ORAL at 17:03

## 2019-10-22 RX ADMIN — TAMSULOSIN HYDROCHLORIDE 0.4 MG: 0.4 CAPSULE ORAL at 17:04

## 2019-10-22 RX ADMIN — CYCLOBENZAPRINE HYDROCHLORIDE 5 MG: 10 TABLET, FILM COATED ORAL at 17:02

## 2019-10-22 RX ADMIN — ESCITALOPRAM OXALATE 10 MG: 10 TABLET ORAL at 05:30

## 2019-10-22 RX ADMIN — PRAVASTATIN SODIUM 80 MG: 80 TABLET ORAL at 17:01

## 2019-10-22 RX ADMIN — OXYCODONE HYDROCHLORIDE 10 MG: 10 TABLET ORAL at 14:39

## 2019-10-22 RX ADMIN — BISACODYL 10 MG: 10 SUPPOSITORY RECTAL at 14:50

## 2019-10-22 RX ADMIN — DOCUSATE SODIUM 100 MG: 100 CAPSULE, LIQUID FILLED ORAL at 08:30

## 2019-10-22 RX ADMIN — INSULIN LISPRO 3 UNITS: 100 INJECTION, SOLUTION INTRAVENOUS; SUBCUTANEOUS at 12:20

## 2019-10-22 RX ADMIN — HYDROMORPHONE HYDROCHLORIDE 0.5 MG: 1 INJECTION, SOLUTION INTRAMUSCULAR; INTRAVENOUS; SUBCUTANEOUS at 12:21

## 2019-10-22 RX ADMIN — METOCLOPRAMIDE HYDROCHLORIDE 10 MG: 10 TABLET ORAL at 11:03

## 2019-10-22 RX ADMIN — CYCLOBENZAPRINE HYDROCHLORIDE 5 MG: 10 TABLET, FILM COATED ORAL at 21:42

## 2019-10-22 RX ADMIN — INSULIN LISPRO 1 UNITS: 100 INJECTION, SOLUTION INTRAVENOUS; SUBCUTANEOUS at 08:36

## 2019-10-23 VITALS
SYSTOLIC BLOOD PRESSURE: 141 MMHG | BODY MASS INDEX: 30.07 KG/M2 | HEART RATE: 77 BPM | WEIGHT: 198.41 LBS | DIASTOLIC BLOOD PRESSURE: 84 MMHG | HEIGHT: 68 IN | OXYGEN SATURATION: 95 % | RESPIRATION RATE: 20 BRPM | TEMPERATURE: 98.1 F

## 2019-10-23 LAB
GLUCOSE SERPL-MCNC: 159 MG/DL (ref 65–140)
GLUCOSE SERPL-MCNC: 236 MG/DL (ref 65–140)

## 2019-10-23 PROCEDURE — 99024 POSTOP FOLLOW-UP VISIT: CPT | Performed by: UROLOGY

## 2019-10-23 PROCEDURE — 99024 POSTOP FOLLOW-UP VISIT: CPT | Performed by: PHYSICIAN ASSISTANT

## 2019-10-23 PROCEDURE — 82948 REAGENT STRIP/BLOOD GLUCOSE: CPT

## 2019-10-23 RX ADMIN — DOCUSATE SODIUM 100 MG: 100 CAPSULE, LIQUID FILLED ORAL at 08:16

## 2019-10-23 RX ADMIN — ESCITALOPRAM OXALATE 10 MG: 10 TABLET ORAL at 05:46

## 2019-10-23 RX ADMIN — OXYCODONE HYDROCHLORIDE 5 MG: 5 TABLET ORAL at 05:56

## 2019-10-23 RX ADMIN — POLYETHYLENE GLYCOL 3350 17 G: 17 POWDER, FOR SOLUTION ORAL at 05:55

## 2019-10-23 RX ADMIN — INSULIN LISPRO 3 UNITS: 100 INJECTION, SOLUTION INTRAVENOUS; SUBCUTANEOUS at 11:55

## 2019-10-23 RX ADMIN — INSULIN LISPRO 1 UNITS: 100 INJECTION, SOLUTION INTRAVENOUS; SUBCUTANEOUS at 08:19

## 2019-10-23 RX ADMIN — METOCLOPRAMIDE HYDROCHLORIDE 10 MG: 10 TABLET ORAL at 11:50

## 2019-10-23 RX ADMIN — GABAPENTIN 400 MG: 400 CAPSULE ORAL at 08:16

## 2019-10-23 RX ADMIN — FAMOTIDINE 20 MG: 20 TABLET ORAL at 08:16

## 2019-10-23 RX ADMIN — METOCLOPRAMIDE HYDROCHLORIDE 10 MG: 10 TABLET ORAL at 05:46

## 2019-10-23 RX ADMIN — CYCLOBENZAPRINE HYDROCHLORIDE 5 MG: 10 TABLET, FILM COATED ORAL at 08:16

## 2019-10-23 RX ADMIN — SODIUM CHLORIDE, SODIUM LACTATE, POTASSIUM CHLORIDE, AND CALCIUM CHLORIDE 125 ML/HR: .6; .31; .03; .02 INJECTION, SOLUTION INTRAVENOUS at 02:08

## 2019-10-24 NOTE — TELEPHONE ENCOUNTER
Called and left message for return call, number provided  Patient needs post op call and confirmation of follow up appointment

## 2019-10-25 NOTE — TELEPHONE ENCOUNTER
Called and spoke with patient's daughter at this time  She reports her dad was unavailable at this time  Patient;s daughter confirmed follow up appointment for 11/5/19 at 1130am at the [facility] office with Dr Jersey Dumont  She reports her dad is 'doing well and hasn't had any problems ' Unable to do full post up call at the time

## 2019-10-26 LAB
ABO GROUP BLD BPU: NORMAL
ABO GROUP BLD BPU: NORMAL
BPU ID: NORMAL
BPU ID: NORMAL
CROSSMATCH: NORMAL
CROSSMATCH: NORMAL
UNIT DISPENSE STATUS: NORMAL
UNIT DISPENSE STATUS: NORMAL
UNIT PRODUCT CODE: NORMAL
UNIT PRODUCT CODE: NORMAL
UNIT RH: NORMAL
UNIT RH: NORMAL

## 2019-11-05 ENCOUNTER — OFFICE VISIT (OUTPATIENT)
Dept: UROLOGY | Facility: CLINIC | Age: 64
End: 2019-11-05

## 2019-11-05 VITALS
DIASTOLIC BLOOD PRESSURE: 84 MMHG | HEIGHT: 68 IN | HEART RATE: 71 BPM | BODY MASS INDEX: 30.01 KG/M2 | SYSTOLIC BLOOD PRESSURE: 130 MMHG | WEIGHT: 198 LBS

## 2019-11-05 DIAGNOSIS — N20.0 NEPHROLITHIASIS: ICD-10-CM

## 2019-11-05 DIAGNOSIS — N28.89 RENAL MASS, LEFT: Primary | ICD-10-CM

## 2019-11-05 DIAGNOSIS — D36.9: ICD-10-CM

## 2019-11-05 PROBLEM — R31.0 GROSS HEMATURIA: Status: RESOLVED | Noted: 2019-08-01 | Resolved: 2019-11-05

## 2019-11-05 PROCEDURE — 99024 POSTOP FOLLOW-UP VISIT: CPT | Performed by: UROLOGY

## 2019-11-05 NOTE — PROGRESS NOTES
Problem List Items Addressed This Visit        Genitourinary    Nephrolithiasis       Other    Renal mass, left - Primary      Other Visit Diagnoses     Papillary adenoma        Relevant Orders    CT abdomen pelvis w wo contrast    Basic metabolic panel            Discussion:  New diagnosis of papillary adenoma, I discussed with the patient that this is different than renal cell carcinoma as it represents a low-grade cancer  We will obtain a 6 month CT scan for surveillance purposes, if doing well at that time he can follow on a yearly basis with a renal ultrasound prior  He did ask me about undergoing left-sided knee surgery, I would clear him for any surgery from an orthopedic perspective once he completely recovers from partial nephrectomy, it approximately 4 weeks  He is healing well, incisions are clean, dry, and intact, all questions and concerns answered and addressed today          Assessment and plan:       Please see problem oriented charting for the assessment plan of today's urological complaints    Maude Dubon MD      Chief Complaint     Chief Complaint   Patient presents with   Tonnie Labor Hematuria    Left renal mass status post partial nephrectomy      History of Present Illness     Pedro Rich is a 60 y o  gentleman status post a partial nephrectomy October 21st   Pathology showed a papillary  adenoma, negative margins  In terms of his recovery he is doing quite well, no bleeding issues, no fevers, no chills, doing well overall, ECOG performance status of 0  In terms of new complaints today he has none  The following portions of the patient's history were reviewed and updated as appropriate: allergies, current medications, past family history, past medical history, past social history, past surgical history and problem list     Detailed Urologic History     - please refer to HPI    Review of Systems     Review of Systems   Constitutional: Negative  HENT: Negative  Eyes: Negative  Respiratory: Negative  Cardiovascular: Negative  Gastrointestinal: Negative  Endocrine: Negative  Genitourinary:        As per hpi   Musculoskeletal: Negative  Skin: Negative  Allergic/Immunologic: Negative  Neurological: Negative  Hematological: Negative  Psychiatric/Behavioral: Negative  Allergies     Allergies   Allergen Reactions    Bacitracin-Polymyxin B      Redness and swelling in eye  Redness and swelling in eye      Latex Rash       Physical Exam     Physical Exam   Constitutional: He is oriented to person, place, and time  He appears well-developed and well-nourished  No distress  HENT:   Head: Normocephalic and atraumatic  Eyes: Pupils are equal, round, and reactive to light  EOM are normal  Right eye exhibits no discharge  Left eye exhibits no discharge  Neck: No tracheal deviation present  Cardiovascular: Intact distal pulses  Pulmonary/Chest: Effort normal  No stridor  No respiratory distress  Abdominal: Soft  He exhibits no distension and no mass  There is no tenderness  There is no rebound and no guarding  No hernia  Incisions are clean, dry, and intact, his skin glue is flaking off nicely   Musculoskeletal: He exhibits no edema, tenderness or deformity  Neurological: He is alert and oriented to person, place, and time  No cranial nerve deficit  Coordination normal    Skin: Skin is warm and dry  No rash noted  He is not diaphoretic  No erythema  No pallor  Psychiatric: He has a normal mood and affect  His behavior is normal  Judgment and thought content normal    Nursing note and vitals reviewed            Vital Signs  Vitals:    11/05/19 1126   BP: 130/84   BP Location: Left arm   Patient Position: Sitting   Cuff Size: Standard   Pulse: 71   Weight: 89 8 kg (198 lb)   Height: 5' 7 5" (1 715 m)         Current Medications       Current Outpatient Medications:     aspirin 81 mg chewable tablet, Chew 81 mg daily , Disp: , Rfl:     betamethasone, augmented, (DIPROLENE-AF) 0 05 % cream, Apply topically, Disp: , Rfl:     Blood Glucose Monitoring Suppl (D-CARE GLUCOMETER) w/Device KIT, by Does not apply route, Disp: , Rfl:     escitalopram (LEXAPRO) 10 mg tablet, Take 10 mg by mouth daily in the early morning , Disp: , Rfl:     gabapentin (NEURONTIN) 400 mg capsule, Take 400 mg by mouth 3 (three) times a day , Disp: , Rfl:     glipiZIDE (GLUCOTROL) 5 mg tablet, TAKE 1 TABLET BY MOUTH TWICE DAILY BEFORE MEAL(S), Disp: , Rfl:     glucose blood test strip, USE STRIP TO CHECK GLUCOSE THREE TIMES DAILY, Disp: , Rfl:     GLYXAMBI 25-5 MG TABS, Take 1 capsule by mouth daily after breakfast , Disp: , Rfl:     lisinopril (ZESTRIL) 5 mg tablet, Take 5 mg by mouth daily in the early morning , Disp: , Rfl:     Melatonin 10 MG TABS, Take 10 mg by mouth daily at bedtime, Disp: , Rfl:     metFORMIN (GLUCOPHAGE) 1000 MG tablet, Take 1,000 mg by mouth 2 (two) times a day with meals , Disp: , Rfl:     multivitamin (THERAGRAN) TABS, Take 1 tablet by mouth daily, Disp: , Rfl:     omega-3-acid ethyl esters (LOVAZA) 1 g capsule, Take 2 g by mouth 2 (two) times a day, Disp: , Rfl:     polyethylene glycol (MIRALAX) 17 g packet, Take 17 g by mouth daily, Disp: 14 each, Rfl: 0    ranitidine (ZANTAC) 150 MG capsule, Take 150 mg by mouth daily in the early morning, Disp: , Rfl:     simvastatin (ZOCOR) 40 mg tablet, Take 40 mg by mouth daily at bedtime , Disp: , Rfl:     tamsulosin (FLOMAX) 0 4 mg, Take 0 4 mg by mouth daily with dinner , Disp: , Rfl:     zolpidem (AMBIEN) 10 mg tablet, TAKE 1 TABLET BY MOUTH AT BEDTIME AS NEEDED FOR SLEEP, Disp: , Rfl:       Active Problems     Patient Active Problem List   Diagnosis    Renal mass, left    Nephrolithiasis         Past Medical History     Past Medical History:   Diagnosis Date    Arthritis     [facility] )     kidney    Diabetes mellitus ([facility] )     NIDDM    GERD (gastroesophageal reflux disease)     Hx of bleeding disorder     hematuria    Hyperlipemia     Hypertension     Kidney stone          Surgical History     Past Surgical History:   Procedure Laterality Date    COLONOSCOPY      KNEE ARTHROSCOPY Left     [place] LAP,PARTIAL NEPHRECTOMY Left 10/21/2019    Procedure: ROBOTIC LAPAROSCOPIC PARTIAL NEPHRECTOMY;  Surgeon: Darrel Garza MD;  [facility];  Service: Urology    SHOULDER SURGERY Left     WISDOM TOOTH EXTRACTION           Family History     Family History   Problem Relation Age of Onset    No Known Problems Father     Diabetes Mother          Social History     Social History     Social History     Tobacco Use   Smoking Status Former Smoker    Packs/day: 1 00    Years: 40 00    Pack years: 40 00    Last attempt to quit: 10/15/2011    Years since quittin 0   Smokeless Tobacco Never Used         Pertinent Lab Values     Lab Results   Component Value Date    CREATININE 1 12 10/22/2019       No results found for: PSA      Final Diagnosis   A  Left kidney, partial nephrectomy:  - Papillary adenoma of kidney, 1 cm  See note  - Tumor appears completely excised in the plane of section   - Adjacent renal parenchyma with mild interstitial chronic inflammation      Note:  Tumor is positive with CK7, PAX2 and negative with carbonic anhydrase, , WT1 and P504S, supporting the above interpretation  Papillary adenomas are renal tumors that are, based on the 2016 [facility] classification of renal tumors, low grade papillary neoplasms that are 15mm or less in diameter (Movahid et al , 2016)  This defining size for papillary adenoma was increased from 5 mm in the prior WHO classification as metastases from these tumors are rarely observed in lesions that are ?15mm      Ref: Papillary adenoma  Geremias Piña B, Oneida H, Mae G, Colin LILLY, John Jordan Tan PH, [facility]   In: World Health Organization Classification of Tumours: Pathology and Genetics of Tumours of the Urinary System and Male Genital Organs  Quiana Faulkner , ANAHI Roman , Troy MORENO , Zenon Crews; [place] [employer], [place] 2016, 42-43      This case was reviewed at the intradepartmental  conference            Pertinent Imaging      No new imaging for my review

## 2020-03-06 ENCOUNTER — OFFICE VISIT (OUTPATIENT)
Dept: GASTROENTEROLOGY | Facility: CLINIC | Age: 65
End: 2020-03-06
Payer: COMMERCIAL

## 2020-03-06 VITALS
BODY MASS INDEX: 30.62 KG/M2 | HEIGHT: 68 IN | WEIGHT: 202 LBS | HEART RATE: 80 BPM | SYSTOLIC BLOOD PRESSURE: 102 MMHG | DIASTOLIC BLOOD PRESSURE: 70 MMHG | RESPIRATION RATE: 18 BRPM

## 2020-03-06 DIAGNOSIS — Z12.11 COLON CANCER SCREENING: Primary | ICD-10-CM

## 2020-03-06 PROCEDURE — 99243 OFF/OP CNSLTJ NEW/EST LOW 30: CPT | Performed by: INTERNAL MEDICINE

## 2020-03-06 NOTE — LETTER
March 6, 2020     Silverio Fang MD  [address]  [address]  Psychiatric [address] [facility]  [address]    Patient: Carolyn Saavedra   YOB: 1955   Date of Visit: 3/6/2020       Dear Dr Samira Jain:    Thank you for referring Margarita Monson to me for evaluation  Below are my notes for this consultation  If you have questions, please do not hesitate to call me  I look forward to following your patient along with you  Sincerely,        Jessica Valadez MD        CC: No Recipients  Jessica Valadez MD  3/6/2020 10:43 AM  Incomplete  [facility]    Dear Dr Samira Jain,     I had the pleasure of seeing your patient Carolyn Saavedra in the office today and I thank you for this kind referral        Chief Complaint:  Need for screening colonoscopy      HPI:  Carolyn Saavedra is a 60 y o  male who presents with request for an average risk screening colonoscopy  According to the patient he had a colonoscopy done many years ago  He does not recall exactly when but probably more than 10  He had no findings at that time  The patient denies any abdominal pain, change in bowel habits, change in stool caliber, melanotic stool, hematochezia, rectal bleeding, tenesmus, or weight loss  There is a maternal uncle who was diagnosed with colon cancer in his late 62s  This actually turns out to be his mother's half brother  The patient has no other family history  He denies any shortness of breath, chest pain, dizziness, or any other symptomatology  He takes a daily baby aspirin but no other medications to thin his blood         Review of Systems:   Constitutional: No fever or chills, feels well, no tiredness, no recent weight gain or weight loss  HENT: No complaints of earache, no hearing loss, no nosebleeds, no nasal discharge, no sore throat, no hoarseness  Eyes: No complaints of eye pain, no red eyes, no discharge from eyes, no itchy eyes    Cardiovascular: No complaints of slow heart rate, no fast heart rate, no chest pain, no palpitations, no leg claudication, no lower extremity edema  Respiratory: No complaints of shortness of breath, no wheezing, no cough, no SOB on exertion, no orthopnea  Gastrointestinal: As noted in HPI  Genitourinary: No complaints of dysuria, no incontinence, no hesitancy, no nocturia  Musculoskeletal: No complaints of arthralgia, no myalgias, no joint swelling or stiffness, no limb pain or swelling  Neurological: No complaints of headache, no confusion, no convulsions, no numbness or tingling, no dizziness or fainting, no limb weakness, no difficulty walking  Skin: No complaints of skin rash or skin lesions, no itching, no skin wound, no dry skin  Hematological/Lymphatic: No complaints of swollen glands, does not bleed easy  Allergic/Immunologic: No immunocompromised state  Endocrine:  No complaints of polyuria, no polydipsia  Psychiatric/Behavioral: is not suicidal, no sleep disturbances, no anxiety or depression, no change in personality, no emotional problems         Historical Information   Past Medical History:   Diagnosis Date    Arthritis     Cancer ([place] [address] )     kidney    Diabetes mellitus ([facility] )     NIDDM    GERD (gastroesophageal reflux disease)     Hx of bleeding disorder     hematuria    Hyperlipemia     Hypertension     Kidney stone      Past Surgical History:   Procedure Laterality Date    COLONOSCOPY      KNEE ARTHROSCOPY Left     [place] LAP,PARTIAL NEPHRECTOMY Left 10/21/2019    Procedure: ROBOTIC LAPAROSCOPIC PARTIAL NEPHRECTOMY;  Surgeon: Lisbeth Tran MD;  [facility];  Service: Urology    SHOULDER SURGERY Left     WISDOM TOOTH EXTRACTION       Social History   Social History     Substance and Sexual Activity   Alcohol Use Not Currently     Social History     Substance and Sexual Activity   Drug Use Never     Social History     Tobacco Use   Smoking Status Former Smoker    Packs/day: 1 00    Years: 40 00  Pack years: 40 00    Last attempt to quit: 10/15/2011    Years since quittin 3   Smokeless Tobacco Never Used     Family History   Problem Relation Age of Onset    No Known Problems Father     Diabetes Mother     Colon cancer Maternal Uncle          Current Medications: has a current medication list which includes the following prescription(s): aspirin, betamethasone (augmented), d-care glucometer, escitalopram, gabapentin, glipizide, glucose blood, glyxambi, lisinopril, melatonin, metformin, multivitamin, omega-3-acid ethyl esters, polyethylene glycol, ranitidine, simvastatin, tamsulosin, and zolpidem  Vital Signs: /70   Pulse 80   Resp 18   Ht 5' 7 5" (1 715 m)   Wt 91 6 kg (202 lb)   BMI 31 17 kg/m²      Physical Exam:   Constitutional  General Appearance: No acute distress, well appearing and well nourished  Head  Normocephalic  Eyes  Conjunctivae and lids: No swelling, erythema, or discharge  Pupils and irises: Equal, round and reactive to light  Ears, Nose, Mouth, and Throat  External inspection of ears and nose: Normal  Nasal mucosa, septum and turbinates: Normal without edema or erythema/   Oropharynx: Normal with no erythema, edema, exudate or lesions  Neck  Normal range of motion  Neck supple  Cardiovascular  Auscultation of the heart: Normal rate and rhythm, normal S1 and S2 without murmurs  Examination of the extremities for edema and/or varicosities: Normal  Pulmonary/Chest  Respiratory effort: No increased work of breathing or signs of respiratory distress  Auscultation of lungs: Clear to auscultation, equal breath sounds bilaterally, no wheezes, rales, no rhonchi  Abdomen  Abdomen: Non-tender, no masses  Liver and spleen: No hepatomegaly or splenomegaly  Musculoskeletal  Gait and station: normal   Digits and Nails: normal without clubbing or cyanosis  Inspection/palpation of joints, bones, and muscles: Normal  Neurological  No nystagmus or asterixis  Skin  Skin and subcutaneous tissue: Normal without rashes or lesions  Lymphatic  Palpation of the lymph nodes in neck: No lymphadenopathy  Psychiatric  Orientation to person, place and time: Normal   Mood and affect: Normal          Labs:   Lab Results   Component Value Date    BUN 14 10/22/2019    CALCIUM 8 0 (L) 10/22/2019     10/22/2019    CO2 27 10/22/2019    CREATININE 1 12 10/22/2019    HCT 38 7 10/22/2019    HGB 12 1 10/22/2019    HGBA1C 7 3 (H) 10/16/2019     10/22/2019    K 4 0 10/22/2019    WBC 6 19 10/22/2019         X-Rays & Procedures:   No orders to display         ______________________________________________________________________      Assessment & Plan:      Diagnoses and all orders for this visit:    Colon cancer screening        I have taken liberty of scheduling the patient for colonoscopy  I will be happy to inform you of his results and any further recommendations  I would like to thank you for allowing me to participate in his care              With warmest regards,    Angelito Vazquez MD, [facility]

## 2020-03-06 NOTE — PROGRESS NOTES
[facility]    Dear Dr Su Russ,     I had the pleasure of seeing your patient Vincent Bosworth in the office today and I thank you for this kind referral        Chief Complaint:  Need for screening colonoscopy      HPI:  Vincent Bosworth is a 60 y o  male who presents with request for an average risk screening colonoscopy  According to the patient he had a colonoscopy done many years ago  He does not recall exactly when but probably more than 10  He had no findings at that time  The patient denies any abdominal pain, change in bowel habits, change in stool caliber, melanotic stool, hematochezia, rectal bleeding, tenesmus, or weight loss  There is a maternal uncle who was diagnosed with colon cancer in his late 62s  This actually turns out to be his mother's half brother  The patient has no other family history  He denies any shortness of breath, chest pain, dizziness, or any other symptomatology  He takes a daily baby aspirin but no other medications to thin his blood         Review of Systems:   Constitutional: No fever or chills, feels well, no tiredness, no recent weight gain or weight loss  HENT: No complaints of earache, no hearing loss, no nosebleeds, no nasal discharge, no sore throat, no hoarseness  Eyes: No complaints of eye pain, no red eyes, no discharge from eyes, no itchy eyes  Cardiovascular: No complaints of slow heart rate, no fast heart rate, no chest pain, no palpitations, no leg claudication, no lower extremity edema  Respiratory: No complaints of shortness of breath, no wheezing, no cough, no SOB on exertion, no orthopnea  Gastrointestinal: As noted in HPI  Genitourinary: No complaints of dysuria, no incontinence, no hesitancy, no nocturia  Musculoskeletal: No complaints of arthralgia, no myalgias, no joint swelling or stiffness, no limb pain or swelling     Neurological: No complaints of headache, no confusion, no convulsions, no numbness or tingling, no dizziness or fainting, no limb weakness, no difficulty walking  Skin: No complaints of skin rash or skin lesions, no itching, no skin wound, no dry skin  Hematological/Lymphatic: No complaints of swollen glands, does not bleed easy  Allergic/Immunologic: No immunocompromised state  Endocrine:  No complaints of polyuria, no polydipsia  Psychiatric/Behavioral: is not suicidal, no sleep disturbances, no anxiety or depression, no change in personality, no emotional problems         Historical Information   Past Medical History:   Diagnosis Date    Arthritis     Cancer ([facility] )     kidney    Diabetes mellitus ([place] [address] )     NIDDM    GERD (gastroesophageal reflux disease)     Hx of bleeding disorder     hematuria    Hyperlipemia     Hypertension     Kidney stone      Past Surgical History:   Procedure Laterality Date    COLONOSCOPY      KNEE ARTHROSCOPY Left     [place] LAP,PARTIAL NEPHRECTOMY Left 10/21/2019    Procedure: ROBOTIC LAPAROSCOPIC PARTIAL NEPHRECTOMY;  Surgeon: Lisbeth Tran MD;  [facility];  Service: Urology    SHOULDER SURGERY Left     WISDOM TOOTH EXTRACTION       Social History   Social History     Substance and Sexual Activity   Alcohol Use Not Currently     Social History     Substance and Sexual Activity   Drug Use Never     Social History     Tobacco Use   Smoking Status Former Smoker    Packs/day: 1 00    Years: 40 00    Pack years: 40 00    Last attempt to quit: 10/15/2011    Years since quittin 3   Smokeless Tobacco Never Used     Family History   Problem Relation Age of Onset    No Known Problems Father     Diabetes Mother     Colon cancer Maternal Uncle          Current Medications: has a current medication list which includes the following prescription(s): aspirin, betamethasone (augmented), d-care glucometer, escitalopram, gabapentin, glipizide, glucose blood, glyxambi, lisinopril, melatonin, metformin, multivitamin, omega-3-acid ethyl esters, polyethylene glycol, ranitidine, simvastatin, tamsulosin, and zolpidem  Vital Signs: /70   Pulse 80   Resp 18   Ht 5' 7 5" (1 715 m)   Wt 91 6 kg (202 lb)   BMI 31 17 kg/m²     Physical Exam:   Constitutional  General Appearance: No acute distress, well appearing and well nourished  Head  Normocephalic  Eyes  Conjunctivae and lids: No swelling, erythema, or discharge  Pupils and irises: Equal, round and reactive to light  Ears, Nose, Mouth, and Throat  External inspection of ears and nose: Normal  Nasal mucosa, septum and turbinates: Normal without edema or erythema/   Oropharynx: Normal with no erythema, edema, exudate or lesions  Neck  Normal range of motion  Neck supple  Cardiovascular  Auscultation of the heart: Normal rate and rhythm, normal S1 and S2 without murmurs  Examination of the extremities for edema and/or varicosities: Normal  Pulmonary/Chest  Respiratory effort: No increased work of breathing or signs of respiratory distress  Auscultation of lungs: Clear to auscultation, equal breath sounds bilaterally, no wheezes, rales, no rhonchi  Abdomen  Abdomen: Non-tender, no masses  Liver and spleen: No hepatomegaly or splenomegaly  Musculoskeletal  Gait and station: normal   Digits and Nails: normal without clubbing or cyanosis  Inspection/palpation of joints, bones, and muscles: Normal  Neurological  No nystagmus or asterixis  Skin  Skin and subcutaneous tissue: Normal without rashes or lesions  Lymphatic  Palpation of the lymph nodes in neck: No lymphadenopathy     Psychiatric  Orientation to person, place and time: Normal   Mood and affect: Normal          Labs:   Lab Results   Component Value Date    BUN 14 10/22/2019    CALCIUM 8 0 (L) 10/22/2019     10/22/2019    CO2 27 10/22/2019    CREATININE 1 12 10/22/2019    HCT 38 7 10/22/2019    HGB 12 1 10/22/2019    HGBA1C 7 3 (H) 10/16/2019     10/22/2019    K 4 0 10/22/2019    WBC 6 19 10/22/2019 X-Rays & Procedures:   No orders to display         ______________________________________________________________________      Assessment & Plan:      Diagnoses and all orders for this visit:    Colon cancer screening        I have taken liberty of scheduling the patient for colonoscopy  I will be happy to inform you of his results and any further recommendations  I would like to thank you for allowing me to participate in his care              With warmest regards,    Myra Hansen MD, [facility]

## 2020-03-06 NOTE — H&P (VIEW-ONLY)
[facility]    Dear Dr Janell Maria,     I had the pleasure of seeing your patient Lasha Raman in the office today and I thank you for this kind referral        Chief Complaint:  Need for screening colonoscopy      HPI:  Lasha Raman is a 60 y o  male who presents with request for an average risk screening colonoscopy  According to the patient he had a colonoscopy done many years ago  He does not recall exactly when but probably more than 10  He had no findings at that time  The patient denies any abdominal pain, change in bowel habits, change in stool caliber, melanotic stool, hematochezia, rectal bleeding, tenesmus, or weight loss  There is a maternal uncle who was diagnosed with colon cancer in his late 62s  This actually turns out to be his mother's half brother  The patient has no other family history  He denies any shortness of breath, chest pain, dizziness, or any other symptomatology  He takes a daily baby aspirin but no other medications to thin his blood         Review of Systems:   Constitutional: No fever or chills, feels well, no tiredness, no recent weight gain or weight loss  HENT: No complaints of earache, no hearing loss, no nosebleeds, no nasal discharge, no sore throat, no hoarseness  Eyes: No complaints of eye pain, no red eyes, no discharge from eyes, no itchy eyes  Cardiovascular: No complaints of slow heart rate, no fast heart rate, no chest pain, no palpitations, no leg claudication, no lower extremity edema  Respiratory: No complaints of shortness of breath, no wheezing, no cough, no SOB on exertion, no orthopnea  Gastrointestinal: As noted in HPI  Genitourinary: No complaints of dysuria, no incontinence, no hesitancy, no nocturia  Musculoskeletal: No complaints of arthralgia, no myalgias, no joint swelling or stiffness, no limb pain or swelling     Neurological: No complaints of headache, no confusion, no convulsions, no numbness or tingling, no dizziness or fainting, no limb weakness, no difficulty walking  Skin: No complaints of skin rash or skin lesions, no itching, no skin wound, no dry skin  Hematological/Lymphatic: No complaints of swollen glands, does not bleed easy  Allergic/Immunologic: No immunocompromised state  Endocrine:  No complaints of polyuria, no polydipsia  Psychiatric/Behavioral: is not suicidal, no sleep disturbances, no anxiety or depression, no change in personality, no emotional problems         Historical Information   Past Medical History:   Diagnosis Date    Arthritis     Cancer [facility] )     kidney    Diabetes mellitus ([place] [id number] )     NIDDM    GERD (gastroesophageal reflux disease)     Hx of bleeding disorder     hematuria    Hyperlipemia     Hypertension     Kidney stone      Past Surgical History:   Procedure Laterality Date    COLONOSCOPY      KNEE ARTHROSCOPY Left     [place] LAP,PARTIAL NEPHRECTOMY Left 10/21/2019    Procedure: ROBOTIC LAPAROSCOPIC PARTIAL NEPHRECTOMY;  Surgeon: Elizabeht Antunez MD;  Location: [facility];  Service: Urology    SHOULDER SURGERY Left     WISDOM TOOTH EXTRACTION       Social History   Social History     Substance and Sexual Activity   Alcohol Use Not Currently     Social History     Substance and Sexual Activity   Drug Use Never     Social History     Tobacco Use   Smoking Status Former Smoker    Packs/day: 1 00    Years: 40 00    Pack years: 40 00    Last attempt to quit: 10/15/2011    Years since quittin 3   Smokeless Tobacco Never Used     Family History   Problem Relation Age of Onset    No Known Problems Father     Diabetes Mother     Colon cancer Maternal Uncle          Current Medications: has a current medication list which includes the following prescription(s): aspirin, betamethasone (augmented), d-care glucometer, escitalopram, gabapentin, glipizide, glucose blood, glyxambi, lisinopril, melatonin, metformin, multivitamin, omega-3-acid ethyl esters, polyethylene glycol, ranitidine, simvastatin, tamsulosin, and zolpidem  Vital Signs: /70   Pulse 80   Resp 18   Ht 5' 7 5" (1 715 m)   Wt 91 6 kg (202 lb)   BMI 31 17 kg/m²     Physical Exam:   Constitutional  General Appearance: No acute distress, well appearing and well nourished  Head  Normocephalic  Eyes  Conjunctivae and lids: No swelling, erythema, or discharge  Pupils and irises: Equal, round and reactive to light  Ears, Nose, Mouth, and Throat  External inspection of ears and nose: Normal  Nasal mucosa, septum and turbinates: Normal without edema or erythema/   Oropharynx: Normal with no erythema, edema, exudate or lesions  Neck  Normal range of motion  Neck supple  Cardiovascular  Auscultation of the heart: Normal rate and rhythm, normal S1 and S2 without murmurs  Examination of the extremities for edema and/or varicosities: Normal  Pulmonary/Chest  Respiratory effort: No increased work of breathing or signs of respiratory distress  Auscultation of lungs: Clear to auscultation, equal breath sounds bilaterally, no wheezes, rales, no rhonchi  Abdomen  Abdomen: Non-tender, no masses  Liver and spleen: No hepatomegaly or splenomegaly  Musculoskeletal  Gait and station: normal   Digits and Nails: normal without clubbing or cyanosis  Inspection/palpation of joints, bones, and muscles: Normal  Neurological  No nystagmus or asterixis  Skin  Skin and subcutaneous tissue: Normal without rashes or lesions  Lymphatic  Palpation of the lymph nodes in neck: No lymphadenopathy     Psychiatric  Orientation to person, place and time: Normal   Mood and affect: Normal          Labs:   Lab Results   Component Value Date    BUN 14 10/22/2019    CALCIUM 8 0 (L) 10/22/2019     10/22/2019    CO2 27 10/22/2019    CREATININE 1 12 10/22/2019    HCT 38 7 10/22/2019    HGB 12 1 10/22/2019    HGBA1C 7 3 (H) 10/16/2019     10/22/2019    K 4 0 10/22/2019    WBC 6 19 10/22/2019 X-Rays & Procedures:   No orders to display         ______________________________________________________________________      Assessment & Plan:      Diagnoses and all orders for this visit:    Colon cancer screening        I have taken liberty of scheduling the patient for colonoscopy  I will be happy to inform you of his results and any further recommendations  I would like to thank you for allowing me to participate in his care              With warmest regards,    Freda Florian MD,

## 2020-03-12 ENCOUNTER — ANESTHESIA EVENT (OUTPATIENT)
Dept: GASTROENTEROLOGY | Facility: HOSPITAL | Age: 65
End: 2020-03-12

## 2020-03-12 ENCOUNTER — HOSPITAL ENCOUNTER (OUTPATIENT)
Dept: GASTROENTEROLOGY | Facility: HOSPITAL | Age: 65
Setting detail: OUTPATIENT SURGERY
Discharge: HOME | End: 2020-03-12
Attending: INTERNAL MEDICINE | Admitting: INTERNAL MEDICINE
Payer: COMMERCIAL

## 2020-03-12 ENCOUNTER — ANESTHESIA (OUTPATIENT)
Dept: GASTROENTEROLOGY | Facility: HOSPITAL | Age: 65
End: 2020-03-12

## 2020-03-12 VITALS
SYSTOLIC BLOOD PRESSURE: 135 MMHG | WEIGHT: 192.68 LBS | HEART RATE: 59 BPM | OXYGEN SATURATION: 95 % | HEIGHT: 66 IN | TEMPERATURE: 97.5 F | RESPIRATION RATE: 18 BRPM | BODY MASS INDEX: 30.97 KG/M2 | DIASTOLIC BLOOD PRESSURE: 86 MMHG

## 2020-03-12 DIAGNOSIS — Z12.11 COLON CANCER SCREENING: ICD-10-CM

## 2020-03-12 LAB — GLUCOSE SERPL-MCNC: 179 MG/DL (ref 65–140)

## 2020-03-12 PROCEDURE — 82948 REAGENT STRIP/BLOOD GLUCOSE: CPT

## 2020-03-12 PROCEDURE — 45378 DIAGNOSTIC COLONOSCOPY: CPT | Performed by: INTERNAL MEDICINE

## 2020-03-12 RX ORDER — LIDOCAINE HYDROCHLORIDE 10 MG/ML
INJECTION, SOLUTION EPIDURAL; INFILTRATION; INTRACAUDAL; PERINEURAL AS NEEDED
Status: DISCONTINUED | OUTPATIENT
Start: 2020-03-12 | End: 2020-03-12 | Stop reason: SURG

## 2020-03-12 RX ORDER — SODIUM CHLORIDE, SODIUM LACTATE, POTASSIUM CHLORIDE, CALCIUM CHLORIDE 600; 310; 30; 20 MG/100ML; MG/100ML; MG/100ML; MG/100ML
125 INJECTION, SOLUTION INTRAVENOUS CONTINUOUS
Status: DISCONTINUED | OUTPATIENT
Start: 2020-03-12 | End: 2020-03-16 | Stop reason: HOSPADM

## 2020-03-12 RX ORDER — PROPOFOL 10 MG/ML
INJECTION, EMULSION INTRAVENOUS AS NEEDED
Status: DISCONTINUED | OUTPATIENT
Start: 2020-03-12 | End: 2020-03-12 | Stop reason: SURG

## 2020-03-12 RX ADMIN — PROPOFOL 50 MG: 10 INJECTION, EMULSION INTRAVENOUS at 10:51

## 2020-03-12 RX ADMIN — PROPOFOL 50 MG: 10 INJECTION, EMULSION INTRAVENOUS at 10:47

## 2020-03-12 RX ADMIN — SODIUM CHLORIDE, SODIUM LACTATE, POTASSIUM CHLORIDE, AND CALCIUM CHLORIDE 125 ML/HR: .6; .31; .03; .02 INJECTION, SOLUTION INTRAVENOUS at 10:31

## 2020-03-12 RX ADMIN — LIDOCAINE HYDROCHLORIDE 50 MG: 10 INJECTION, SOLUTION EPIDURAL; INFILTRATION; INTRACAUDAL; PERINEURAL at 10:43

## 2020-03-12 RX ADMIN — PROPOFOL 100 MG: 10 INJECTION, EMULSION INTRAVENOUS at 10:43

## 2020-03-12 NOTE — INTERVAL H&P NOTE
H&P reviewed  After examining the patient I find no changes in the patients condition since the H&P had been written      Vitals:    03/12/20 1022   BP: 155/80   Pulse: 57   Resp: 16   Temp: 98 3 °F (36 8 °C)   SpO2: 98%

## 2020-03-12 NOTE — ANESTHESIA PREPROCEDURE EVALUATION
Review of Systems/Medical History  Patient summary reviewed  Chart reviewed  No history of anesthetic complications     Cardiovascular  EKG reviewed, Hyperlipidemia, Hypertension ,   Comment: Normal sinus rhythm  Normal ECG  No previous ECGs available  Confirmed by [place] Clayton ([id number]) on 10/17/2019 1:37:48 PM,  Pulmonary  Smoker ex-smoker  Cumulative Pack Years: 36,        GI/Hepatic    GERD ,        Kidney stones,        Endo/Other  Diabetes poorly controlled type 2 ,      GYN       Hematology   Musculoskeletal    Arthritis     Neurology   Psychology       PSH:    SHOULDER SURGERY KNEE ARTHROSCOPY   COLONOSCOPY WISDOM TOOTH EXTRACTION   AR LAP,PARTIAL NEPHRECTOMY          Physical Exam    Airway    Mallampati score: II  TM Distance: >3 FB  Neck ROM: full     Dental   No notable dental hx     Cardiovascular  Cardiovascular exam normal    Pulmonary  Pulmonary exam normal Breath sounds clear to auscultation,     Other Findings        Anesthesia Plan  ASA Score- 3     Anesthesia Type- IV sedation with anesthesia with ASA Monitors  Additional Monitors:   Airway Plan:         Plan Factors- Patient instructed to abstain from smoking on day of procedure       Induction- intravenous  Postoperative Plan-     Informed Consent- Anesthetic plan and risks discussed with patient  I personally reviewed this patient with the CRNA  Discussed and agreed on the Anesthesia Plan with the CRNA             Lab Results   Component Value Date    WBC 6 19 10/22/2019    HGB 12 1 10/22/2019    HCT 38 7 10/22/2019    MCV 92 10/22/2019     10/22/2019     Lab Results   Component Value Date    CALCIUM 8 0 (L) 10/22/2019    K 4 0 10/22/2019    CO2 27 10/22/2019     10/22/2019    BUN 14 10/22/2019    CREATININE 1 12 10/22/2019     Lab Results   Component Value Date    INR 0 99 10/16/2019    PROTIME 13 1 10/16/2019     Lab Results   Component Value Date    PTT 31 10/16/2019     Type and Screen:  B        Discussed with pt the benefits/alternatives and risks or General Anesthesia including breathing tube remaining in place if not strong enough, PONV, damage to lips and teeth, sore throat, eye injury or blindness    I, Dr Richi Nickerson, the attending physician, have personally seen and evaluated the patient prior to anesthetic care  I have reviewed the pre-anesthetic record, and other medical records if appropriate to the anesthetic care  If a CRNA is involved in the case, I have reviewed the CRNA assessment, if present, and agree  The patient is in a suitable condition to proceed with my formulated anesthetic plan

## 2020-03-12 NOTE — ANESTHESIA POSTPROCEDURE EVALUATION
Post-Op Assessment Note    CV Status:  Stable    Pain management: adequate     Mental Status:  Awake, sleepy and arousable   Hydration Status:  Euvolemic   PONV Controlled:  Controlled   Airway Patency:  Patent   Post Op Vitals Reviewed: Yes      Staff: CRNA           /69 (03/12/20 1057)    Temp 97 5 °F (36 4 °C) (03/12/20 1057)    Pulse 64 (03/12/20 1057)   Resp 16 (03/12/20 1057)    SpO2 97 % (03/12/20 1057)

## 2020-04-30 ENCOUNTER — HOSPITAL ENCOUNTER (OUTPATIENT)
Dept: CT IMAGING | Facility: HOSPITAL | Age: 65
Discharge: HOME | End: 2020-04-30
Attending: UROLOGY
Payer: COMMERCIAL

## 2020-04-30 DIAGNOSIS — D36.9: ICD-10-CM

## 2020-04-30 PROCEDURE — 74178 CT ABD&PLV WO CNTR FLWD CNTR: CPT

## 2020-04-30 RX ADMIN — IOHEXOL 100 ML: 350 INJECTION, SOLUTION INTRAVENOUS at 13:10

## 2020-05-13 ENCOUNTER — TELEPHONE (OUTPATIENT)
Dept: UROLOGY | Facility: CLINIC | Age: 65
End: 2020-05-13

## 2020-05-14 ENCOUNTER — TELEPHONE (OUTPATIENT)
Dept: UROLOGY | Facility: CLINIC | Age: 65
End: 2020-05-14

## 2020-05-19 PROBLEM — D36.9: Status: ACTIVE | Noted: 2020-05-19

## 2020-05-19 PROBLEM — N28.89 RENAL MASS, LEFT: Status: RESOLVED | Noted: 2019-08-01 | Resolved: 2020-05-19

## 2020-09-29 ENCOUNTER — TELEPHONE (OUTPATIENT)
Dept: UROLOGY | Facility: CLINIC | Age: 65
End: 2020-09-29

## 2020-09-29 NOTE — TELEPHONE ENCOUNTER
Please call patient and inform him that his appt for Friday 10/2 needs to be reschedule as he has not completed his US  Please provide patient with central scheduling phone number and then patient can be schedule a week after his US is completed  Thank you

## 2020-09-29 NOTE — TELEPHONE ENCOUNTER
Patient managed by Dr Raysa Herron for Nephrolithiasis and Renal mass, Left  Patient was seen in our [facility] office on 11/05/2019  Patient was added on to Dr Raysa Herron schedule for 10/02/2020 for his six month follow up that was canceled before due to COVID-19  Patient was to return in six months with imagining prior to appointment  While chart prepping for patient, he has not done his US and nothing is scheduled for that at this time  I am sending a message to provider to see if patient is still able to be seen w/o imagining, or if he should do his US and reschedule

## 2020-09-29 NOTE — TELEPHONE ENCOUNTER
Patient called and was upset his appointment was canceled  I tried to explain it looks like he has US that was not done and he said no I had a CT done  I tried to explain the CT was done and it looks like the doctor wants a US done also  I transferred patient to central scheduling and he wants a call back  He said he does not want to wait for months to get in to the office and was agitated  Please call patient back at [phone]

## 2020-09-29 NOTE — TELEPHONE ENCOUNTER
US was scheduled for 10/12/20  Looking at scheduling appointment for the week after that once results are in to review  No available appointments that week with [facility]  Can you please advise on appointment  Thank you

## 2020-10-12 ENCOUNTER — HOSPITAL ENCOUNTER (OUTPATIENT)
Dept: ULTRASOUND IMAGING | Facility: HOSPITAL | Age: 65
Discharge: HOME | End: 2020-10-12
Attending: UROLOGY
Payer: COMMERCIAL

## 2020-10-12 DIAGNOSIS — D36.9: ICD-10-CM

## 2020-10-12 PROCEDURE — 76770 US EXAM ABDO BACK WALL COMP: CPT

## 2020-10-12 NOTE — TELEPHONE ENCOUNTER
Called and left message for patient to see how he wants to proceed  If he wants a followup appointment or if he would like to wait

## 2020-10-20 ENCOUNTER — OFFICE VISIT (OUTPATIENT)
Dept: UROLOGY | Facility: CLINIC | Age: 65
End: 2020-10-20
Payer: COMMERCIAL

## 2020-10-20 VITALS
SYSTOLIC BLOOD PRESSURE: 128 MMHG | WEIGHT: 200.4 LBS | HEIGHT: 67 IN | BODY MASS INDEX: 31.45 KG/M2 | HEART RATE: 62 BPM | DIASTOLIC BLOOD PRESSURE: 90 MMHG

## 2020-10-20 DIAGNOSIS — Z12.5 PROSTATE CANCER SCREENING: ICD-10-CM

## 2020-10-20 DIAGNOSIS — N28.89 RENAL MASS, LEFT: ICD-10-CM

## 2020-10-20 DIAGNOSIS — R31.0 GROSS HEMATURIA: Primary | ICD-10-CM

## 2020-10-20 LAB — POST-VOID RESIDUAL VOLUME, ML POC: 53 ML

## 2020-10-20 PROCEDURE — 99214 OFFICE O/P EST MOD 30 MIN: CPT | Performed by: PHYSICIAN ASSISTANT

## 2020-10-20 PROCEDURE — 51798 US URINE CAPACITY MEASURE: CPT | Performed by: PHYSICIAN ASSISTANT

## 2020-12-18 ENCOUNTER — HOSPITAL ENCOUNTER (EMERGENCY)
Facility: HOSPITAL | Age: 65
Discharge: HOME | End: 2020-12-18
Attending: EMERGENCY MEDICINE | Admitting: EMERGENCY MEDICINE
Payer: COMMERCIAL

## 2020-12-18 ENCOUNTER — APPOINTMENT (EMERGENCY)
Dept: CT IMAGING | Facility: HOSPITAL | Age: 65
End: 2020-12-18
Payer: COMMERCIAL

## 2020-12-18 VITALS
TEMPERATURE: 98.2 F | HEART RATE: 65 BPM | RESPIRATION RATE: 19 BRPM | OXYGEN SATURATION: 98 % | DIASTOLIC BLOOD PRESSURE: 61 MMHG | SYSTOLIC BLOOD PRESSURE: 119 MMHG

## 2020-12-18 DIAGNOSIS — R73.9 HYPERGLYCEMIA: ICD-10-CM

## 2020-12-18 DIAGNOSIS — E86.0 DEHYDRATION: Primary | ICD-10-CM

## 2020-12-18 LAB
ALBUMIN SERPL BCP-MCNC: 3.7 G/DL (ref 3.5–5)
ALP SERPL-CCNC: 74 U/L (ref 46–116)
ALT SERPL W P-5'-P-CCNC: 41 U/L (ref 12–78)
ANION GAP SERPL CALCULATED.3IONS-SCNC: 11 MMOL/L (ref 4–13)
AST SERPL W P-5'-P-CCNC: 28 U/L (ref 5–45)
BACTERIA UR QL AUTO: NORMAL /HPF
BASOPHILS # BLD AUTO: 0.02 THOUSANDS/ΜL (ref 0–0.1)
BASOPHILS NFR BLD AUTO: 0 % (ref 0–1)
BILIRUB DIRECT SERPL-MCNC: 0.04 MG/DL (ref 0–0.2)
BILIRUB SERPL-MCNC: 0.2 MG/DL (ref 0.2–1)
BILIRUB UR QL STRIP: NEGATIVE
BUN SERPL-MCNC: 21 MG/DL (ref 5–25)
CALCIUM SERPL-MCNC: 8.6 MG/DL (ref 8.3–10.1)
CHLORIDE SERPL-SCNC: 93 MMOL/L (ref 100–108)
CLARITY UR: CLEAR
CO2 SERPL-SCNC: 27 MMOL/L (ref 21–32)
COLOR UR: YELLOW
CREAT SERPL-MCNC: 1.26 MG/DL (ref 0.6–1.3)
EOSINOPHIL # BLD AUTO: 0.02 THOUSAND/ΜL (ref 0–0.61)
EOSINOPHIL NFR BLD AUTO: 0 % (ref 0–6)
ERYTHROCYTE [DISTWIDTH] IN BLOOD BY AUTOMATED COUNT: 12.7 % (ref 11.6–15.1)
GFR SERPL CREATININE-BSD FRML MDRD: 60 ML/MIN/1.73SQ M
GLUCOSE SERPL-MCNC: 280 MG/DL (ref 65–140)
GLUCOSE UR STRIP-MCNC: ABNORMAL MG/DL
HCT VFR BLD AUTO: 41.7 % (ref 36.5–49.3)
HGB BLD-MCNC: 13.7 G/DL (ref 12–17)
HGB UR QL STRIP.AUTO: ABNORMAL
IMM GRANULOCYTES # BLD AUTO: 0.02 THOUSAND/UL (ref 0–0.2)
IMM GRANULOCYTES NFR BLD AUTO: 0 % (ref 0–2)
INR PPP: 1.03 (ref 0.84–1.19)
KETONES UR STRIP-MCNC: NEGATIVE MG/DL
LEUKOCYTE ESTERASE UR QL STRIP: ABNORMAL
LYMPHOCYTES # BLD AUTO: 0.67 THOUSANDS/ΜL (ref 0.6–4.47)
LYMPHOCYTES NFR BLD AUTO: 12 % (ref 14–44)
MCH RBC QN AUTO: 29.3 PG (ref 26.8–34.3)
MCHC RBC AUTO-ENTMCNC: 32.9 G/DL (ref 31.4–37.4)
MCV RBC AUTO: 89 FL (ref 82–98)
MONOCYTES # BLD AUTO: 1.01 THOUSAND/ΜL (ref 0.17–1.22)
MONOCYTES NFR BLD AUTO: 18 % (ref 4–12)
NEUTROPHILS # BLD AUTO: 3.74 THOUSANDS/ΜL (ref 1.85–7.62)
NEUTS SEG NFR BLD AUTO: 70 % (ref 43–75)
NITRITE UR QL STRIP: NEGATIVE
NON-SQ EPI CELLS URNS QL MICRO: NORMAL /HPF
NRBC BLD AUTO-RTO: 0 /100 WBCS
PH UR STRIP.AUTO: 5 [PH]
PLATELET # BLD AUTO: 142 THOUSANDS/UL (ref 149–390)
PMV BLD AUTO: 10.3 FL (ref 8.9–12.7)
POTASSIUM SERPL-SCNC: 4.2 MMOL/L (ref 3.5–5.3)
PROT SERPL-MCNC: 7.4 G/DL (ref 6.4–8.2)
PROT UR STRIP-MCNC: NEGATIVE MG/DL
PROTHROMBIN TIME: 13.7 SECONDS (ref 11.6–14.5)
RBC # BLD AUTO: 4.68 MILLION/UL (ref 3.88–5.62)
RBC #/AREA URNS AUTO: NORMAL /HPF
SODIUM SERPL-SCNC: 131 MMOL/L (ref 136–145)
SP GR UR STRIP.AUTO: 1.01 (ref 1–1.03)
TROPONIN I SERPL-MCNC: <0.02 NG/ML
UROBILINOGEN UR QL STRIP.AUTO: 0.2 E.U./DL
WBC # BLD AUTO: 5.48 THOUSAND/UL (ref 4.31–10.16)
WBC #/AREA URNS AUTO: NORMAL /HPF

## 2020-12-18 PROCEDURE — 96360 HYDRATION IV INFUSION INIT: CPT

## 2020-12-18 PROCEDURE — 36415 COLL VENOUS BLD VENIPUNCTURE: CPT | Performed by: EMERGENCY MEDICINE

## 2020-12-18 PROCEDURE — 80076 HEPATIC FUNCTION PANEL: CPT | Performed by: EMERGENCY MEDICINE

## 2020-12-18 PROCEDURE — 85025 COMPLETE CBC W/AUTO DIFF WBC: CPT | Performed by: EMERGENCY MEDICINE

## 2020-12-18 PROCEDURE — 93005 ELECTROCARDIOGRAM TRACING: CPT

## 2020-12-18 PROCEDURE — 84484 ASSAY OF TROPONIN QUANT: CPT | Performed by: EMERGENCY MEDICINE

## 2020-12-18 PROCEDURE — 99284 EMERGENCY DEPT VISIT MOD MDM: CPT | Performed by: EMERGENCY MEDICINE

## 2020-12-18 PROCEDURE — 70450 CT HEAD/BRAIN W/O DYE: CPT

## 2020-12-18 PROCEDURE — 81001 URINALYSIS AUTO W/SCOPE: CPT | Performed by: EMERGENCY MEDICINE

## 2020-12-18 PROCEDURE — 99285 EMERGENCY DEPT VISIT HI MDM: CPT

## 2020-12-18 PROCEDURE — 80048 BASIC METABOLIC PNL TOTAL CA: CPT | Performed by: EMERGENCY MEDICINE

## 2020-12-18 PROCEDURE — 85610 PROTHROMBIN TIME: CPT | Performed by: EMERGENCY MEDICINE

## 2020-12-18 RX ADMIN — SODIUM CHLORIDE 1000 ML: 0.9 INJECTION, SOLUTION INTRAVENOUS at 21:01

## 2020-12-19 LAB
ATRIAL RATE: 68 BPM
P AXIS: 38 DEGREES
PR INTERVAL: 170 MS
QRS AXIS: 15 DEGREES
QRSD INTERVAL: 82 MS
QT INTERVAL: 364 MS
QTC INTERVAL: 387 MS
T WAVE AXIS: 1 DEGREES
VENTRICULAR RATE: 68 BPM

## 2020-12-19 PROCEDURE — 93010 ELECTROCARDIOGRAM REPORT: CPT | Performed by: INTERNAL MEDICINE

## 2021-03-30 DIAGNOSIS — Z23 ENCOUNTER FOR IMMUNIZATION: ICD-10-CM

## 2021-10-20 ENCOUNTER — TELEPHONE (OUTPATIENT)
Dept: UROLOGY | Facility: CLINIC | Age: 66
End: 2021-10-20

## (undated) DEVICE — TRAY FOLEY 16FR URIMETER SILICONE SURESTEP

## (undated) DEVICE — TISSUE RETRIEVAL SYSTEM: Brand: INZII RETRIEVAL SYSTEM

## (undated) DEVICE — KERLIX BANDAGE ROLL: Brand: KERLIX

## (undated) DEVICE — 3M™ IOBAN™ 2 ANTIMICROBIAL INCISE DRAPE 6650EZ: Brand: IOBAN™ 2

## (undated) DEVICE — ULNAR NERVE PROTECTOR FOAM POSITIONER: Brand: CARDINAL HEALTH

## (undated) DEVICE — IRRIG ENDO FLO TUBING

## (undated) DEVICE — BASIC SINGLE BASIN 2-LF: Brand: MEDLINE INDUSTRIES, INC.

## (undated) DEVICE — BETHLEHEM MAJOR GENERAL PACK: Brand: CARDINAL HEALTH

## (undated) DEVICE — SUT STRATAFIX SPIRAL PDS 2-0 CT-1 45 CM SXPP1B411

## (undated) DEVICE — FENESTRATED BIPOLAR FORCEPS: Brand: ENDOWRIST;DAVINCI SI

## (undated) DEVICE — LARGE NEEDLE DRIVER: Brand: ENDOWRIST;DAVINCI SI

## (undated) DEVICE — ADHESIVE SKIN HIGH VISCOSITY EXOFIN 1ML

## (undated) DEVICE — SUT SILK 0 30 IN A306H

## (undated) DEVICE — TROCAR PORT ACCESS 12 X120MM W/BLDLS OPTICAL TIP AIRSEAL

## (undated) DEVICE — CHLORAPREP HI-LITE 26ML ORANGE

## (undated) DEVICE — PROGRASP FORCEPS: Brand: ENDOWRIST;DAVINCI SI

## (undated) DEVICE — HEAVY DRAINAGE PACK: Brand: CURITY

## (undated) DEVICE — SURGICEL 4 X 8

## (undated) DEVICE — VIAL DECANTER

## (undated) DEVICE — HEM-O-LOK CLIP CARTRIDGE LARGE DA VINCI SI/XI

## (undated) DEVICE — 3M™ DURAPORE™ SURGICAL TAPE 1538-3, 3 INCH X 10 YARD (7,5CM X 9,1M), 4 ROLLS/BOX: Brand: 3M™ DURAPORE™

## (undated) DEVICE — DRAPE SHEET THREE QUARTER

## (undated) DEVICE — INSUFFLATION NEEDLE TO ESTABLISH PNEUMOPERITONEUM.: Brand: INSUFFLATION NEEDLE

## (undated) DEVICE — TROCAR: Brand: KII FIOS FIRST ENTRY

## (undated) DEVICE — AIRSEAL TUBE SMOKE EVAC LUMENX3 FILTERED

## (undated) DEVICE — SUT VLOC 90 3-0 CV-23 9 IN VLOCM0844

## (undated) DEVICE — FLOSEAL HEMOSTATIC MATRIX, 10 ML: Brand: FLOSEAL

## (undated) DEVICE — SYRINGE 10ML LL

## (undated) DEVICE — GLOVE INDICATOR PI UNDERGLOVE SZ 8 BLUE

## (undated) DEVICE — SUT ETHILON 3-0 FS-1 18 IN 663G

## (undated) DEVICE — VISUALIZATION SYSTEM: Brand: CLEARIFY

## (undated) DEVICE — INTENDED FOR TISSUE SEPARATION, AND OTHER PROCEDURES THAT REQUIRE A SHARP SURGICAL BLADE TO PUNCTURE OR CUT.: Brand: BARD-PARKER SAFETY BLADES SIZE 11, STERILE

## (undated) DEVICE — FLOSEAL APPLICATOR TIP ENDOSCOPIC

## (undated) DEVICE — PENCIL ELECTROSURG E-Z CLEAN -0035H

## (undated) DEVICE — HEAVY DUTY TABLE COVER: Brand: CONVERTORS

## (undated) DEVICE — ASTOUND STANDARD SURGICAL GOWN, XL: Brand: CONVERTORS

## (undated) DEVICE — SUT MONOCRYL 4-0 PS-2 27 IN Y426H

## (undated) DEVICE — MONOPOLAR CURVED SCISSORS: Brand: ENDOWRIST;DAVINCI SI

## (undated) DEVICE — JACKSON-PRATT 100CC BULB RESERVOIR: Brand: CARDINAL HEALTH

## (undated) DEVICE — PAD GROUNDING ADULT

## (undated) DEVICE — BIPOLAR CORD DISP

## (undated) DEVICE — TIP COVER ACCESSORY

## (undated) DEVICE — ROBOT ACCESSORY KIT 4 ARM